# Patient Record
Sex: MALE | Race: WHITE | NOT HISPANIC OR LATINO | URBAN - METROPOLITAN AREA
[De-identification: names, ages, dates, MRNs, and addresses within clinical notes are randomized per-mention and may not be internally consistent; named-entity substitution may affect disease eponyms.]

---

## 2017-01-05 ENCOUNTER — OFFICE VISIT - RIVER FALLS (OUTPATIENT)
Dept: FAMILY MEDICINE | Facility: CLINIC | Age: 47
End: 2017-01-05

## 2017-01-12 ENCOUNTER — OFFICE VISIT - RIVER FALLS (OUTPATIENT)
Dept: FAMILY MEDICINE | Facility: CLINIC | Age: 47
End: 2017-01-12

## 2017-01-12 ASSESSMENT — MIFFLIN-ST. JEOR: SCORE: 2217.94

## 2017-02-16 ENCOUNTER — OFFICE VISIT - RIVER FALLS (OUTPATIENT)
Dept: FAMILY MEDICINE | Facility: CLINIC | Age: 47
End: 2017-02-16

## 2017-05-03 ENCOUNTER — OFFICE VISIT - RIVER FALLS (OUTPATIENT)
Dept: FAMILY MEDICINE | Facility: CLINIC | Age: 47
End: 2017-05-03

## 2017-05-03 ASSESSMENT — MIFFLIN-ST. JEOR: SCORE: 2087.3

## 2017-07-06 ENCOUNTER — OFFICE VISIT - RIVER FALLS (OUTPATIENT)
Dept: FAMILY MEDICINE | Facility: CLINIC | Age: 47
End: 2017-07-06

## 2017-07-06 ASSESSMENT — MIFFLIN-ST. JEOR: SCORE: 2035.59

## 2017-09-21 ENCOUNTER — AMBULATORY - RIVER FALLS (OUTPATIENT)
Dept: FAMILY MEDICINE | Facility: CLINIC | Age: 47
End: 2017-09-21

## 2017-10-31 ENCOUNTER — OFFICE VISIT - RIVER FALLS (OUTPATIENT)
Dept: FAMILY MEDICINE | Facility: CLINIC | Age: 47
End: 2017-10-31

## 2018-01-09 ENCOUNTER — COMMUNICATION - RIVER FALLS (OUTPATIENT)
Dept: FAMILY MEDICINE | Facility: CLINIC | Age: 48
End: 2018-01-09

## 2018-01-09 ENCOUNTER — AMBULATORY - RIVER FALLS (OUTPATIENT)
Dept: FAMILY MEDICINE | Facility: CLINIC | Age: 48
End: 2018-01-09

## 2018-01-10 LAB
CHOLEST SERPL-MCNC: 113 MG/DL
CHOLEST/HDLC SERPL: 3.6 {RATIO}
CREAT SERPL-MCNC: 1 MG/DL (ref 0.6–1.35)
GLUCOSE BLD-MCNC: 95 MG/DL (ref 65–99)
HBA1C MFR BLD: 5.2 %
HDLC SERPL-MCNC: 31 MG/DL
LDLC SERPL CALC-MCNC: 69 MG/DL
NONHDLC SERPL-MCNC: 82 MG/DL
TRIGL SERPL-MCNC: 58 MG/DL

## 2018-01-16 ENCOUNTER — OFFICE VISIT - RIVER FALLS (OUTPATIENT)
Dept: FAMILY MEDICINE | Facility: CLINIC | Age: 48
End: 2018-01-16

## 2018-01-16 ASSESSMENT — MIFFLIN-ST. JEOR: SCORE: 1970.28

## 2018-02-01 ENCOUNTER — AMBULATORY - RIVER FALLS (OUTPATIENT)
Dept: FAMILY MEDICINE | Facility: CLINIC | Age: 48
End: 2018-02-01

## 2018-06-07 ENCOUNTER — OFFICE VISIT - RIVER FALLS (OUTPATIENT)
Dept: FAMILY MEDICINE | Facility: CLINIC | Age: 48
End: 2018-06-07

## 2018-06-07 ASSESSMENT — MIFFLIN-ST. JEOR: SCORE: 1949.41

## 2018-07-10 ENCOUNTER — OFFICE VISIT - RIVER FALLS (OUTPATIENT)
Dept: FAMILY MEDICINE | Facility: CLINIC | Age: 48
End: 2018-07-10

## 2018-11-01 ENCOUNTER — OFFICE VISIT - RIVER FALLS (OUTPATIENT)
Dept: FAMILY MEDICINE | Facility: CLINIC | Age: 48
End: 2018-11-01

## 2018-11-01 ASSESSMENT — MIFFLIN-ST. JEOR: SCORE: 1973.9

## 2019-01-03 ENCOUNTER — AMBULATORY - RIVER FALLS (OUTPATIENT)
Dept: FAMILY MEDICINE | Facility: CLINIC | Age: 49
End: 2019-01-03

## 2019-01-04 LAB
BUN SERPL-MCNC: 16 MG/DL (ref 7–25)
BUN/CREAT RATIO - HISTORICAL: NORMAL (ref 6–22)
CALCIUM SERPL-MCNC: 9.3 MG/DL (ref 8.6–10.3)
CHLORIDE BLD-SCNC: 103 MMOL/L (ref 98–110)
CHOLEST SERPL-MCNC: 152 MG/DL
CHOLEST/HDLC SERPL: 4.2 {RATIO}
CO2 SERPL-SCNC: 32 MMOL/L (ref 20–32)
CREAT SERPL-MCNC: 0.96 MG/DL (ref 0.7–1.33)
EGFRCR SERPLBLD CKD-EPI 2021: 92 ML/MIN/1.73M2
GLUCOSE BLD-MCNC: 99 MG/DL (ref 65–99)
HBA1C MFR BLD: 5.2 %
HDLC SERPL-MCNC: 36 MG/DL
LDLC SERPL CALC-MCNC: 93 MG/DL
NONHDLC SERPL-MCNC: 116 MG/DL
POTASSIUM BLD-SCNC: 4.4 MMOL/L (ref 3.5–5.3)
SODIUM SERPL-SCNC: 139 MMOL/L (ref 135–146)
TRIGL SERPL-MCNC: 132 MG/DL

## 2019-01-17 ENCOUNTER — OFFICE VISIT - RIVER FALLS (OUTPATIENT)
Dept: FAMILY MEDICINE | Facility: CLINIC | Age: 49
End: 2019-01-17

## 2019-01-17 ASSESSMENT — MIFFLIN-ST. JEOR: SCORE: 2014.73

## 2019-03-21 ENCOUNTER — OFFICE VISIT - RIVER FALLS (OUTPATIENT)
Dept: FAMILY MEDICINE | Facility: CLINIC | Age: 49
End: 2019-03-21

## 2019-05-16 ENCOUNTER — OFFICE VISIT - RIVER FALLS (OUTPATIENT)
Dept: FAMILY MEDICINE | Facility: CLINIC | Age: 49
End: 2019-05-16

## 2019-06-06 ENCOUNTER — OFFICE VISIT - RIVER FALLS (OUTPATIENT)
Dept: FAMILY MEDICINE | Facility: CLINIC | Age: 49
End: 2019-06-06

## 2019-07-23 ENCOUNTER — COMMUNICATION - RIVER FALLS (OUTPATIENT)
Dept: FAMILY MEDICINE | Facility: CLINIC | Age: 49
End: 2019-07-23

## 2019-09-24 ENCOUNTER — OFFICE VISIT - RIVER FALLS (OUTPATIENT)
Dept: FAMILY MEDICINE | Facility: CLINIC | Age: 49
End: 2019-09-24

## 2019-09-24 ASSESSMENT — MIFFLIN-ST. JEOR: SCORE: 2051.92

## 2019-12-17 ENCOUNTER — OFFICE VISIT - RIVER FALLS (OUTPATIENT)
Dept: FAMILY MEDICINE | Facility: CLINIC | Age: 49
End: 2019-12-17

## 2019-12-17 ASSESSMENT — MIFFLIN-ST. JEOR: SCORE: 2072.79

## 2019-12-24 ENCOUNTER — OFFICE VISIT - RIVER FALLS (OUTPATIENT)
Dept: FAMILY MEDICINE | Facility: CLINIC | Age: 49
End: 2019-12-24

## 2019-12-24 ASSESSMENT — MIFFLIN-ST. JEOR: SCORE: 2096.38

## 2020-01-07 ENCOUNTER — AMBULATORY - RIVER FALLS (OUTPATIENT)
Dept: FAMILY MEDICINE | Facility: CLINIC | Age: 50
End: 2020-01-07

## 2020-01-08 ENCOUNTER — COMMUNICATION - RIVER FALLS (OUTPATIENT)
Dept: FAMILY MEDICINE | Facility: CLINIC | Age: 50
End: 2020-01-08

## 2020-01-08 LAB
BUN SERPL-MCNC: 18 MG/DL (ref 7–25)
BUN/CREAT RATIO - HISTORICAL: ABNORMAL (ref 6–22)
CALCIUM SERPL-MCNC: 8.7 MG/DL (ref 8.6–10.3)
CHLORIDE BLD-SCNC: 104 MMOL/L (ref 98–110)
CHOLEST SERPL-MCNC: 156 MG/DL
CHOLEST/HDLC SERPL: 4.1 {RATIO}
CO2 SERPL-SCNC: 28 MMOL/L (ref 20–32)
CREAT SERPL-MCNC: 1.04 MG/DL (ref 0.7–1.33)
EGFRCR SERPLBLD CKD-EPI 2021: 83 ML/MIN/1.73M2
GLUCOSE BLD-MCNC: 100 MG/DL (ref 65–99)
HBA1C MFR BLD: 5.4 %
HDLC SERPL-MCNC: 38 MG/DL
LDLC SERPL CALC-MCNC: 93 MG/DL
NONHDLC SERPL-MCNC: 118 MG/DL
POTASSIUM BLD-SCNC: 4.3 MMOL/L (ref 3.5–5.3)
SODIUM SERPL-SCNC: 139 MMOL/L (ref 135–146)
TRIGL SERPL-MCNC: 148 MG/DL

## 2020-01-21 ENCOUNTER — OFFICE VISIT - RIVER FALLS (OUTPATIENT)
Dept: FAMILY MEDICINE | Facility: CLINIC | Age: 50
End: 2020-01-21

## 2020-01-21 ASSESSMENT — MIFFLIN-ST. JEOR: SCORE: 2108.17

## 2020-06-16 ENCOUNTER — OFFICE VISIT - RIVER FALLS (OUTPATIENT)
Dept: FAMILY MEDICINE | Facility: CLINIC | Age: 50
End: 2020-06-16

## 2020-06-16 ENCOUNTER — COMMUNICATION - RIVER FALLS (OUTPATIENT)
Dept: FAMILY MEDICINE | Facility: CLINIC | Age: 50
End: 2020-06-16

## 2020-06-16 ASSESSMENT — MIFFLIN-ST. JEOR: SCORE: 2105.45

## 2021-01-12 ENCOUNTER — AMBULATORY - RIVER FALLS (OUTPATIENT)
Dept: FAMILY MEDICINE | Facility: CLINIC | Age: 51
End: 2021-01-12

## 2021-01-13 ENCOUNTER — COMMUNICATION - RIVER FALLS (OUTPATIENT)
Dept: FAMILY MEDICINE | Facility: CLINIC | Age: 51
End: 2021-01-13

## 2021-01-13 LAB
BUN SERPL-MCNC: 19 MG/DL (ref 7–25)
BUN/CREAT RATIO - HISTORICAL: ABNORMAL (ref 6–22)
CALCIUM SERPL-MCNC: 9.4 MG/DL (ref 8.6–10.3)
CHLORIDE BLD-SCNC: 105 MMOL/L (ref 98–110)
CHOLEST SERPL-MCNC: 147 MG/DL
CHOLEST/HDLC SERPL: 4.1 {RATIO}
CO2 SERPL-SCNC: 25 MMOL/L (ref 20–32)
CREAT SERPL-MCNC: 1 MG/DL (ref 0.7–1.33)
EGFRCR SERPLBLD CKD-EPI 2021: 86 ML/MIN/1.73M2
GLUCOSE BLD-MCNC: 103 MG/DL (ref 65–99)
HBA1C MFR BLD: 5.3 %
HDLC SERPL-MCNC: 36 MG/DL
LDLC SERPL CALC-MCNC: 88 MG/DL
NONHDLC SERPL-MCNC: 111 MG/DL
POTASSIUM BLD-SCNC: 4.4 MMOL/L (ref 3.5–5.3)
SODIUM SERPL-SCNC: 138 MMOL/L (ref 135–146)
TRIGL SERPL-MCNC: 133 MG/DL

## 2021-01-26 ENCOUNTER — OFFICE VISIT - RIVER FALLS (OUTPATIENT)
Dept: FAMILY MEDICINE | Facility: CLINIC | Age: 51
End: 2021-01-26

## 2021-01-26 ASSESSMENT — MIFFLIN-ST. JEOR: SCORE: 2162.6

## 2021-12-30 ENCOUNTER — COMMUNICATION - RIVER FALLS (OUTPATIENT)
Dept: FAMILY MEDICINE | Facility: CLINIC | Age: 51
End: 2021-12-30

## 2022-01-11 ENCOUNTER — COMMUNICATION - RIVER FALLS (OUTPATIENT)
Dept: FAMILY MEDICINE | Facility: CLINIC | Age: 52
End: 2022-01-11

## 2022-02-03 ENCOUNTER — COMMUNICATION - RIVER FALLS (OUTPATIENT)
Dept: FAMILY MEDICINE | Facility: CLINIC | Age: 52
End: 2022-02-03

## 2022-02-12 VITALS
TEMPERATURE: 97.5 F | BODY MASS INDEX: 34.99 KG/M2 | HEIGHT: 73 IN | HEART RATE: 72 BPM | TEMPERATURE: 97.3 F | BODY MASS INDEX: 35.33 KG/M2 | HEART RATE: 74 BPM | HEART RATE: 72 BPM | HEIGHT: 73 IN | SYSTOLIC BLOOD PRESSURE: 126 MMHG | WEIGHT: 258.8 LBS | BODY MASS INDEX: 34.3 KG/M2 | SYSTOLIC BLOOD PRESSURE: 114 MMHG | TEMPERATURE: 97.9 F | WEIGHT: 264 LBS | DIASTOLIC BLOOD PRESSURE: 68 MMHG | DIASTOLIC BLOOD PRESSURE: 74 MMHG | WEIGHT: 266.6 LBS | DIASTOLIC BLOOD PRESSURE: 67 MMHG | SYSTOLIC BLOOD PRESSURE: 105 MMHG | HEIGHT: 73 IN

## 2022-02-12 VITALS
HEIGHT: 73 IN | DIASTOLIC BLOOD PRESSURE: 71 MMHG | SYSTOLIC BLOOD PRESSURE: 109 MMHG | BODY MASS INDEX: 32.6 KG/M2 | WEIGHT: 246 LBS | TEMPERATURE: 97.4 F | HEART RATE: 67 BPM

## 2022-02-12 VITALS
HEART RATE: 72 BPM | SYSTOLIC BLOOD PRESSURE: 114 MMHG | WEIGHT: 249.8 LBS | DIASTOLIC BLOOD PRESSURE: 77 MMHG | TEMPERATURE: 97 F | HEART RATE: 52 BPM | DIASTOLIC BLOOD PRESSURE: 73 MMHG | BODY MASS INDEX: 32.96 KG/M2 | SYSTOLIC BLOOD PRESSURE: 113 MMHG | WEIGHT: 252.8 LBS | BODY MASS INDEX: 33.35 KG/M2 | TEMPERATURE: 97.6 F

## 2022-02-12 VITALS
OXYGEN SATURATION: 96 % | TEMPERATURE: 97.6 F | WEIGHT: 266 LBS | DIASTOLIC BLOOD PRESSURE: 86 MMHG | HEIGHT: 73 IN | SYSTOLIC BLOOD PRESSURE: 134 MMHG | HEART RATE: 70 BPM | BODY MASS INDEX: 35.25 KG/M2

## 2022-02-12 VITALS
SYSTOLIC BLOOD PRESSURE: 94 MMHG | HEART RATE: 78 BPM | DIASTOLIC BLOOD PRESSURE: 58 MMHG | TEMPERATURE: 97.3 F | BODY MASS INDEX: 31.3 KG/M2 | WEIGHT: 236.2 LBS | HEIGHT: 73 IN

## 2022-02-12 VITALS
TEMPERATURE: 98.5 F | SYSTOLIC BLOOD PRESSURE: 120 MMHG | HEART RATE: 82 BPM | SYSTOLIC BLOOD PRESSURE: 126 MMHG | HEIGHT: 73 IN | BODY MASS INDEX: 36.7 KG/M2 | HEART RATE: 77 BPM | WEIGHT: 290.8 LBS | TEMPERATURE: 97.6 F | BODY MASS INDEX: 38.54 KG/M2 | DIASTOLIC BLOOD PRESSURE: 79 MMHG | DIASTOLIC BLOOD PRESSURE: 77 MMHG | WEIGHT: 278.2 LBS

## 2022-02-12 VITALS
WEIGHT: 253.2 LBS | HEART RATE: 64 BPM | SYSTOLIC BLOOD PRESSURE: 114 MMHG | BODY MASS INDEX: 33.41 KG/M2 | DIASTOLIC BLOOD PRESSURE: 73 MMHG | TEMPERATURE: 97.4 F

## 2022-02-12 VITALS
DIASTOLIC BLOOD PRESSURE: 60 MMHG | HEIGHT: 73 IN | TEMPERATURE: 97.3 F | BODY MASS INDEX: 34.72 KG/M2 | HEART RATE: 72 BPM | WEIGHT: 262 LBS | SYSTOLIC BLOOD PRESSURE: 122 MMHG

## 2022-02-12 VITALS
BODY MASS INDEX: 33.69 KG/M2 | DIASTOLIC BLOOD PRESSURE: 73 MMHG | HEART RATE: 71 BPM | HEIGHT: 73 IN | WEIGHT: 254.2 LBS | TEMPERATURE: 97.2 F | SYSTOLIC BLOOD PRESSURE: 109 MMHG

## 2022-02-12 VITALS
WEIGHT: 237 LBS | DIASTOLIC BLOOD PRESSURE: 68 MMHG | BODY MASS INDEX: 31.41 KG/M2 | RESPIRATION RATE: 16 BRPM | HEIGHT: 73 IN | HEART RATE: 64 BPM | SYSTOLIC BLOOD PRESSURE: 104 MMHG | TEMPERATURE: 97 F

## 2022-02-12 VITALS
WEIGHT: 231.6 LBS | DIASTOLIC BLOOD PRESSURE: 73 MMHG | SYSTOLIC BLOOD PRESSURE: 124 MMHG | DIASTOLIC BLOOD PRESSURE: 62 MMHG | TEMPERATURE: 98 F | BODY MASS INDEX: 30.82 KG/M2 | HEIGHT: 73 IN | SYSTOLIC BLOOD PRESSURE: 116 MMHG | TEMPERATURE: 97.4 F | HEART RATE: 64 BPM | HEART RATE: 63 BPM | BODY MASS INDEX: 30.69 KG/M2 | WEIGHT: 233.6 LBS

## 2022-02-12 VITALS
BODY MASS INDEX: 33.21 KG/M2 | WEIGHT: 250.6 LBS | TEMPERATURE: 97.8 F | DIASTOLIC BLOOD PRESSURE: 74 MMHG | SYSTOLIC BLOOD PRESSURE: 112 MMHG | HEART RATE: 73 BPM | HEIGHT: 73 IN

## 2022-02-12 VITALS
WEIGHT: 278.6 LBS | BODY MASS INDEX: 36.92 KG/M2 | HEIGHT: 73 IN | HEART RATE: 72 BPM | SYSTOLIC BLOOD PRESSURE: 120 MMHG | TEMPERATURE: 97.5 F | DIASTOLIC BLOOD PRESSURE: 82 MMHG

## 2022-02-12 VITALS
TEMPERATURE: 97.7 F | HEART RATE: 74 BPM | BODY MASS INDEX: 31.48 KG/M2 | SYSTOLIC BLOOD PRESSURE: 109 MMHG | WEIGHT: 238.6 LBS | DIASTOLIC BLOOD PRESSURE: 69 MMHG

## 2022-02-16 NOTE — NURSING NOTE
CAGE Assessment Entered On:  1/18/2019 9:37 AM CST    Performed On:  1/17/2019 9:37 AM CST by Tori Szymanski               Assessment   Have you ever felt you should cut down on your drinking :   No   Have people annoyed you by criticizing your drinking :   No   Have you ever felt bad or guilty about your drinking :   No   Have you ever taken a drink first thing in the morning to steady your nerves or get rid of a hangover (Eye-opener) :   No   CAGE Score :   0    Tori Szymanski - 1/18/2019 9:37 AM CST

## 2022-02-16 NOTE — TELEPHONE ENCOUNTER
---------------------  From: Silvio Burns MD   To: VIRGIL MATHEWS    Sent: 1/8/2020 7:45:05 AM CST  Subject: Patient Message - Results Notification        All labs acceptable.  Please let us know you received this message by either selecting Forward or Reply at the top.  Thank you    Results:  Date Result Name Ind Value Ref Range   1/7/2020 8:36 AM Potassium Level  4.3 mmol/L (3.5 - 5.3)   1/7/2020 8:36 AM Glucose Level ((H)) 100 mg/dL (65 - 99)   1/7/2020 8:36 AM Creatinine Level  1.04 mg/dL (0.70 - 1.33)   1/7/2020 8:36 AM Hgb A1c  5.4 ( - <5.7)   1/7/2020 8:36 AM Cholesterol  156 mg/dL ( - <200)   1/7/2020 8:36 AM HDL ((L)) 38 mg/dL (>40 - )   1/7/2020 8:36 AM LDL  93    1/7/2020 8:36 AM Triglyceride  148 mg/dL ( - <150)

## 2022-02-16 NOTE — PROGRESS NOTES
Patient:   VIRGIL MATHEWS            MRN: [id number]            FIN: [id number]               Age:   48 years     Sex:  Male     :  1970   Associated Diagnoses:   Changing skin lesion   Author:   Silvio Burns MD      Visit Information      Date of Service: 2019 09:44 am  Performing Location: [facility]  Encounter#: [id number]      Primary Care Provider (PCP):  Silvio Burns MD    NPI# [id number]      Referring Provider:  Silvio Burns MD# [id number]      Chief Complaint   2019 9:50 AM CDT    Skin concern        History of Present Illness   chief complaint and symptoms as noted above confirmed with patient   lesion right shoulder for a few months         Review of Systems   Constitutional:  Negative except as documented in history of present illness.    Integumentary:  Negative except as documented in history of present illness.       Health Status   Allergies:    Allergic Reactions (Selected)  Severity Not Documented  Cats (No reactions were documented)  Citalopram (Hives)   Medications:  (Selected)   Prescriptions  Prescribed  Lipitor 20 mg oral tablet: = 1 tab(s) ( 20 mg ), PO, Daily, # 90 tab(s), 3 Refill(s), Type: Maintenance, Pharmacy: [facility], 1 tab(s) Oral daily  Xarelto 20 mg oral tablet: = 1 tab(s) ( 20 mg ), po, qpm, # 90 tab(s), 3 Refill(s), Type: Maintenance, Pharmacy: [facility], 1 tab(s) Oral qpm  cyclobenzaprine 10 mg oral tablet: See Instructions, Instructions: TAKE ONE TABLET BY MOUTH THREE TIMES A DAY AS NEEDED FOR PAIN, # 30 tab(s), 1 Refill(s), Type: Soft Stop, Pharmacy: [facility]  ketorolac 0.5% ophthalmic solution: 1 drop(s), Eye-Both, qid, PRN: as needed for itching, # 5 mL, 11 Refill(s), Type: Soft Stop, Pharmacy: [facility], 1 drop(s) Eye-Both qid,PRN:as needed for itching  olopatadine 0.1% ophthalmic solution: 1 drop(s), both eyes, bid, PRN: for allergy symptoms, # 5 mL, 11 Refill(s), Type: Maintenance,  Pharmacy: [facility], 1 drop(s) Eye-Both bid,PRN:for allergy symptoms  raNITIdine 300 mg oral tablet: = 1 tab(s) ( 300 mg ), po, daily, # 90 tab(s), 3 Refill(s), Type: Maintenance, Pharmacy: [facility], 1 tab(s) Oral daily  traMADol 50 mg oral tablet: 1 tab(s) ( 50 mg ), PO, q4hr, PRN: for pain, # 60 tab(s), 1 Refill(s), Type: Maintenance, called to pharmacy (Rx)  venlafaxine 150 mg oral capsule, extended release: = 1 cap(s) ( 150 mg ), po, daily, # 90 cap(s), 3 Refill(s), Type: Maintenance, Pharmacy: [facility], 1 cap(s) Oral daily  Documented Medications  Documented  Claritin 10 mg oral tablet: 1 tab(s) ( 10 mg ), po, daily, 0 Refill(s), Type: Maintenance  Flonase 50 mcg/inh nasal spray: 2 spray(s), nasal, daily, 0 Refill(s), Type: Maintenance  Multiple Vitamins oral tablet: 1 tab(s), PO, Daily, 0 Refill(s)  Tylenol: PO, 0 Refill(s)  clotrimazole 1% topical cream: Topical, bid, Instructions: to replace Econazole 1% cream which is not covered by insurance., 0 Refill(s), Type: Maintenance   Problem list:    All Problems (Selected)  Carpal tunnel syndrome, left / SNOMED CT 19227770 / Confirmed  Back pain, chronic / SNOMED CT 157011938 / Confirmed  Dysthymia / SNOMED CT 406496384 / Confirmed  GERD (gastroesophageal reflux disease) / SNOMED CT 103232600 / Confirmed  Hx pulmonary embolism / SNOMED CT 640230061 / Confirmed  Low HDL (under 40) / SNOMED CT 883481159 / Confirmed  Monoclonal gammopathy / SNOMED CT 096619359 / Confirmed  Obese / SNOMED CT 3944290155 / Probable  Seasonal allergies / SNOMED CT 664792258 / Confirmed  Snoring disorder / ICD-9-.09 / Confirmed      Histories   Past Medical History:    Active  Dysthymia (302297523): Onset on 7/22/2010 at 39 years.  Comments:  7/22/2010 CDT 1:18 PM CDT -     GERD (gastroesophageal reflux disease) (293499860)  Low HDL (under 40) (271524878)  Obese (4356244536)  Seasonal allergies (024407685)  Comments:  12/19/2012 CST 9:33 AM ANIYAH - [facility]  [facility]  Stable.  Back pain, chronic (176909185)  Comments:  12/19/2012 CST 9:54 AM [facility] Qureshi  An MRI has been done previously, maybe in 2005.  Snoring disorder (786.09)  Comments:  12/19/2012 CST 9:40 AM [facility] Qureshi  ENT evaluation by Dr. Jaleel Douglass on 05/07/2008.  A sleep study was performed; there was no evidence of apnea.    12/19/2012 CST 9:44 AM [facility] Qureshi  This began after a severe pharyngitis.  Carpal tunnel syndrome, left (14311300)  Comments:  12/19/2012 CST 9:49 AM [facility] Qureshi  Marcaine and Celestone injection done on 08/17/2007.  Resolved  *Hospitalized@[facility] - Bilateral pulmonary emboli: Onset on 5/15/2014 at 43 years.  Resolved on 5/16/2014 at 43 years.  Folliculitis (32292013): Onset in the month of 4/2008 at 37 years  Resolved.  Comments:  12/19/2012 CST 9:46 AM [facility] Qureshi  Right thigh.  Irregular heartbeat (566570340): Onset on 4/17/2006 at 35 years.  Resolved.  Comments:  12/19/2012 CST 9:55 AM [facility] Qureshi  Holter monitor placed.  Multiple pulmonary emboli (07166974):  Resolved.  Bilateral pulmonary embolism (32175924):  Resolved.   Family History:    Carpal Tunnel Syndrome  Mother  Asthma  Grandfather (P)  Daughter (Ceola)  Hypertension  Mother  Heart disease  Father  CA - Cancer  Mother  Comments:  1/12/2016 3:42 PM Angeline Dawson  skin  Hypercholesterolemia  Mother  Parkinson disease  Mother  HTN - Hypertension  Father  Back  Mother  Father  Prostate cancer..  Uncle  Comments:  12/19/2012 9:23 AM [facility] Qureshi  Prostate cancer in his 60s.  GERD - Gastro-esophageal reflux disease  Sister  ASHD - Atherosclerotic heart disease  Mother     Procedure history:    Screening for colon cancer (SNOMED CT 0438120016) performed by Silvio Burns MD on 3/19/2019 at 48 Years.  Comments:  3/26/2019 9:05 AM CDT - Shanika Shipman MA result:  negative  Recommendation:  f/u 3yrs  Cardiac computed tomography for calcium scoring (SNOMED CT 9491703590) on  1/24/2017 at 46 Years.  Comments:  2/2/2017 12:14 PM ANIYAH Bermudez Jackelyn  Adriane  Total Agatston calcium score is 18  Vasectomy (SNOMED CT 42366715) on 2/20/2009 at 38 Years.  Polysomnogram (SNOMED CT 090072905) on 4/21/2008 at 37 Years.  Comments:  12/19/2012 10:00 AM [facility] Qureshi  Normal sleep architecture.  No evidence of obstructive sleep apnea.  Wrist injection (SNOMED CT 104357782) on 8/17/2007 at 36 Years.  Comments:  12/19/2012 9:50 AM [facility] Qureshi  Marcaine and Celestone injection, left wrist, for carpal tunnel symptoms.  Holter monitor (SNOMED CT 740448952) performed by Britton Pepe MD on 4/20/2006 at 35 Years.  Comments:  12/19/2012 10:03 AM [facility] Qureshi  Single 7-beat episode of asymptomatic slow atrial tachycardia with a rate of 102.  No other significant dysrhythmia.  Single episode of patient symptoms without dysrhythmia.  Cellulitis (SNOMED CT 3020498402) in 2005 at 35 Years.  Stress test ECG - treadmill (SNOMED CT 466911279) on 6/7/2005 at 34 Years.  Comments:  12/19/2012 10:06 AM [facility] Qureshi  Performed at [facility] for non-exertional chest discomfort.  Adenoidectomy (SNOMED CT 850371615).  Tonsillectomy (SNOMED CT 409933965).  Lymph node removal from axilla.   Social History:        Alcohol Assessment            Current, 2-4 TIMES PER WEEK, 1 drinks/episode average.      Tobacco Assessment            Never      Substance Abuse Assessment            Never      Employment and Education Assessment            Employed, Work/School description: .      Home and Environment Assessment            Marital status: .  Spouse/Partner name: Mera Caballero.  3 children.  Risks in environment: owns               secured gun.      Nutrition and Health Assessment            Type of diet: Regular.      Exercise and Physical Activity Assessment            Exercise frequency: 1-2 times/week.  Exercise type: Walking, elliptical.      Sexual Assessment             Sexually active: Yes.  Sexual orientation: Straight or heterosexual.  Contraceptive Use Details: vasectomy,               wife had hysterectomy.        Physical Examination   Vital Signs   9/24/2019 9:50 AM CDT Temperature Tympanic 97.2 DegF  LOW    Peripheral Pulse Rate 71 bpm    HR Method Electronic    Systolic Blood Pressure 109 mmHg    Diastolic Blood Pressure 73 mmHg    Mean Arterial Pressure 85 mmHg    BP Method Electronic      Measurements from flowsheet : Measurements   9/24/2019 9:50 AM CDT Height Measured - Standard 73 in    Weight Measured - Standard 254.2 lb    BSA 2.43 m2    Body Mass Index 33.53 kg/m2  HI      General:  Alert and oriented, No acute distress.    Integumentary:  0.5 cm elevated blanka like lesion right post shoulder  frozen x3 with liquid nitrogen.    Neurologic:  Alert, Oriented.       Impression and Plan   Diagnosis     Changing skin lesion ([id number]-KI L98.9).     Course:  Progressing as expected.    Plan:  Wound therapy, likely BCC  fu 1 month if not gone.    Patient Instructions:       Counseled: Patient, Regarding diagnosis, Activity.

## 2022-02-16 NOTE — TELEPHONE ENCOUNTER
Entered by Charlotte Villabla on October 27, 2020 9:54:11 AM CDT  ---------------------  From: Charlotte Villalba   To: [facility]    Sent: 10/27/2020 9:54:11 AM CDT  Subject: Medication Management     ** Submitted: **  Order:famotidine (famotidine 40 mg oral tablet)  1 tab(s)  Oral  daily  Qty:  90 tab(s)        Refills:  0          Substitutions Allowed     Route To Pharmacy - [facility]    Signed by Charlotte Villalba  10/27/2020 2:53:00 PM UT    ** Submitted: **  Complete:famotidine (Pepcid 40 mg oral tablet)   Signed by Charlotte Villalba  10/27/2020 2:54:00 PM UT    ** Not Approved:  **  famotidine (FAMOTIDINE 40MG TABS)  TAKE ONE TABLET BY MOUTH EVERY DAY  Qty:  90 unknown unit        Days Supply:  90        Refills:  3          Substitutions Allowed     Route To [facility] - [facility]   Signed by Charlotte Villalba          Med Refill      Date of last office visit and reason:  6/16/20 for shoulder/injection with ARIANA      Date of last Med Check / Px:   Px with TFS 1/21/20  Date of last labs pertaining to med:  n/a    RTC order in chart:  Yes, UTD through 1/21    For Protocol refill, has patient been contacted:  Filled for 1 year at last px.  Will fill for 3 months.     Medication filled or not filled per clinic policy.       ------------------------------------------  From: [facility]  To: Silvio Burns MD  Sent: October 27, 2020 9:00:11 AM CDT  Subject: Medication Management  Due: October 8, 2020 2:04:31 PM CDT     ** On Hold Pending Signature **     Drug: famotidine (famotidine 40 mg oral tablet), TAKE ONE TABLET BY MOUTH EVERY DAY  Quantity: 90 unknown unit  Days Supply: 90  Refills: 2  Substitutions Allowed  Notes from Pharmacy:     Dispensed Drug: famotidine (famotidine 40 mg oral tablet), TAKE ONE TABLET BY MOUTH EVERY DAY  Quantity: 90 unknown unit  Days Supply: 90  Refills: 3  Substitutions Allowed  Notes from Pharmacy:  ------------------------------------------

## 2022-02-16 NOTE — PROGRESS NOTES
Patient:   VIRGIL MATHEWS            MRN: [id number]            FIN: [id number]               Age:   46 years     Sex:  Male     :  1970   Associated Diagnoses:   Back pain, chronic; Dysthymia; HTN (Hypertension); Multiple pulmonary emboli; Seasonal Allergies   Author:   Silvio Burns MD      Visit Information      Date of Service: 2017 08:27 am  Performing Location: [facility]  Encounter#: [id number]      Primary Care Provider (PCP):  Silvio Burns MD# [id number]      Referring Provider:  Silvio Burns MD# [id number]      Chief Complaint   2017 8:34 AM CDT     HTN checkup.        History of Present Illness   Doing well Working hard on wt loss                The patient presents for follow-up evaluation of hypertension.  The context of the hypertension: blood pressure was maintained within the target range.  Relieving factors consist of medication.  Prior treatment consists of lifestyle modification weight reduction.        Review of Systems   Constitutional:  Negative except as documented in history of present illness.    Ear/Nose/Mouth/Throat:  Negative.    Respiratory:  Negative.    Cardiovascular:  Negative.    Gastrointestinal:  Negative.    Genitourinary:  Negative.    Musculoskeletal:  Negative except as documented in history of present illness.    Integumentary:  Negative except as documented in history of present illness.    Neurologic:  Negative.       Health Status   Allergies:    Allergic Reactions (Selected)  Severity Not Documented  Cats (No reactions were documented)  Citalopram (Hives)   Medications:  (Selected)   Prescriptions  Prescribed  Flexeril 10 mg oral tablet: 1 tab(s) ( 10 mg ), PO, TID, PRN: for pain, # 30 tab(s), 1 Refill(s), Type: Maintenance, called to pharmacy (Rx), 1 tab(s) po tid,PRN:for pain  Lipitor 20 mg oral tablet: 1 tab(s) ( 20 mg ), PO, Daily, # 90 tab(s), 1 Refill(s), Type: Maintenance, Pharmacy: [facility],  1 tab(s) po daily  Pataday 0.2% ophthalmic solution: 1 drop(s), both eyes, daily, # 2.5 mL, 5 Refill(s), Type: Soft Stop, Pharmacy: [facility], 1 drop(s) both eyes daily  Xarelto 20 mg oral tablet: 1 tab(s) ( 20 mg ), po, qpm, # 90 tab(s), 1 Refill(s), Type: Maintenance, Pharmacy: [facility], 1 tab(s) po qpm  econazole 1% topical cream: 1 modesto, TOP, BID, # 85 g, 5 Refill(s), Type: Maintenance, Pharmacy: [facility], 1 modesto top bid  ketorolac 0.5% ophthalmic solution: 1 drop(s), Both eyes, QID, PRN: for itching, # 5 mL, 6 Refill(s), Type: Maintenance, Pharmacy: [facility], 1 drop(s) both eyes qid,PRN:for itching  lisinopril 10 mg oral tablet: 1 tab(s) ( 10 mg ), po, daily, # 90 tab(s), 1 Refill(s), Type: Maintenance, Pharmacy: [facility], 1 tab(s) po daily  raNITIdine 300 mg oral tablet: 1 tab(s) ( 300 mg ), po, daily, # 90 tab(s), 1 Refill(s), Type: Maintenance, Pharmacy: [facility], 1 tab(s) po daily  traMADol 50 mg oral tablet: 1 tab(s) ( 50 mg ), PO, q4hr, PRN: for pain, # 60 tab(s), 1 Refill(s), Type: Maintenance, called to pharmacy (Rx)  venlafaxine 150 mg oral capsule, extended release: 1 cap(s) ( 150 mg ), po, daily, # 90 cap(s), 1 Refill(s), Type: Maintenance, Pharmacy: [facility], 1 cap(s) po daily  Documented Medications  Documented  Claritin 10 mg oral tablet: 1 tab(s) ( 10 mg ), po, daily, 0 Refill(s), Type: Maintenance  Flonase 50 mcg/inh nasal spray: 2 spray(s), nasal, daily, 0 Refill(s), Type: Maintenance  Multiple Vitamins oral tablet: 1 tab(s), PO, Daily, 0 Refill(s)  Tylenol: PO, 0 Refill(s)   Problem list:    All Problems  GERD (Gastroesophageal Reflux Disease) / ICD-9-.81 / Confirmed  Low HDL (under 40) / SNOMED CT 998634675 / Confirmed  HTN (Hypertension) / ICD-9-.9 / Confirmed  Dysthymia / ICD-9-.4 / Confirmed  Obese / ICD-9-.00 / Probable  Seasonal Allergies / ICD-9-.9 / Confirmed  Back pain, chronic / ICD-9-.5 /  Confirmed  Snoring disorder / ICD-9-.09 / Confirmed  Carpal tunnel syndrome, left / ICD-9-.0 / Confirmed  Multiple pulmonary emboli / SNOMED CT 10906469 / Confirmed  Bilateral pulmonary embolism / SNOMED CT 74215342 / Confirmed  Inactive: Tinea pedis / SNOMED CT 34758622  Resolved: Folliculitis / SNOMED CT 82707024  Resolved: Irregular Heartbeat / ICD-9-.9  Resolved: *Hospitalized@[facility] - Bilateral pulmonary emboli      Histories   Past Medical History:    Active  GERD (Gastroesophageal Reflux Disease) (530.81)  Low HDL (under 40) (521711453)  HTN (Hypertension) (401.9)  Seasonal Allergies (477.9)  Comments:  12/19/2012 CST 9:33 AM Peace Qureshi  Stable.  Back pain, chronic (724.5)  Comments:  12/19/2012 CST 9:54 AM Peace Qureshi  An MRI has been done previously, maybe in 2005.  Snoring disorder (786.09)  Comments:  12/19/2012 CST 9:40 AM Peace Qureshi  ENT evaluation by Dr. Jaleel Douglass on 05/07/2008.  A sleep study was performed; there was no evidence of apnea.    12/19/2012 CST 9:44 AM Peace Qureshi  This began after a severe pharyngitis.  Carpal tunnel syndrome, left (354.0)  Comments:  12/19/2012 CST 9:49 AM Peace Qureshi  Marcaine and Celestone injection done on 08/17/2007.  Resolved  *Hospitalized@[facility] - Bilateral pulmonary emboli: Onset on 5/15/2014 at 43 years.  Resolved on 5/16/2014 at 43 years.  Folliculitis (67016650): Onset in the month of 4/2008 at 37 years  Resolved.  Comments:  12/19/2012 CST 9:46 AM Peace Qureshi  Right thigh.  Irregular Heartbeat (427.9): Onset on 4/17/2006 at 35 years.  Resolved.  Comments:  12/19/2012 CST 9:55 AM Peace Qureshi  Holter monitor placed.   Family History:    Carpal Tunnel Syndrome  Mother  Asthma  Grandfather (P)  Daughter (Ceola)  Hypertension  Mother  Heart disease  Father  CA - Cancer  Mother  Comments:  1/12/2016 3:42 PM - Angeline Kong  Hypercholesterolemia  Mother  Parkinson disease  Mother  HTN -  Hypertension  Father  Back  Mother  Father  Prostate cancer..  Uncle  Comments:  12/19/2012 9:23 AM - Peace Ibarra  Prostate cancer in his 60s.  ASHD - Atherosclerotic heart disease  Mother     Procedure history:    Cardiac computed tomography for calcium scoring (SNOMED CT 3902015209) on 1/24/2017 at 46 Years.  Comments:  2/2/2017 12:14 PM - Adriane Hayden  Total Agatston calcium score is 18  Vasectomy (SNOMED CT 66367223) on 2/20/2009 at 38 Years.  Polysomnogram (SNOMED CT 116531517) on 4/21/2008 at 37 Years.  Comments:  12/19/2012 10:00 AM - Peace Ibarra  Normal sleep architecture.  No evidence of obstructive sleep apnea.  Wrist injection (SNOMED CT 430263537) on 8/17/2007 at 36 Years.  Comments:  12/19/2012 9:50 AM - Peace Ibarra  Marcaine and Celestone injection, left wrist, for carpal tunnel symptoms.  Holter monitor (SNOMED CT 977307935) performed by Britton Pepe MD on 4/20/2006 at 35 Years.  Comments:  12/19/2012 10:03 AM - Peace Ibarra  Single 7-beat episode of asymptomatic slow atrial tachycardia with a rate of 102.  No other significant dysrhythmia.  Single episode of patient symptoms without dysrhythmia.  Cellulitis (SNOMED CT 9936863720) in 2005 at 35 Years.  Stress test ECG - treadmill (SNOMED CT 414987663) on 6/7/2005 at 34 Years.  Comments:  12/19/2012 10:06 AM - Peace Ibarra  Performed at [facility] for non-exertional chest discomfort.  Adenoidectomy (SNOMED CT 074057739).  Tonsillectomy (SNOMED CT 941768547).  Lymph node removal from axilla.   Social History:        Alcohol Assessment: Current            Current                     Comments:                      03/08/2017 - Adelia Manzo                     1-2 drinks 2-3 times per week      Tobacco Assessment: Denies Tobacco Use            Never      Substance Abuse Assessment: Denies Substance Abuse            Never      Employment and Education Assessment            Employed, Work/School description: .      Home and  Environment Assessment            Marital status: .  Spouse/Partner name: Mera Caballero.  3 children.      Nutrition and Health Assessment            Type of diet: Regular.        Physical Examination   Vital Signs   7/6/2017 8:34 AM CDT Temperature Tympanic 97.8 DegF  LOW    Peripheral Pulse Rate 73 bpm    Systolic Blood Pressure 112 mmHg    Diastolic Blood Pressure 74 mmHg    Mean Arterial Pressure 87 mmHg      Measurements from flowsheet : Measurements   7/6/2017 8:34 AM CDT Height Measured - Standard 73 in    Weight Measured - Standard 250.6 lb    BSA 2.42 m2    Body Mass Index 33.06 kg/m2      General:  Alert and oriented, No acute distress.    Eye:  Pupils are equal, round and reactive to light, Extraocular movements are intact.    HENT:  Normocephalic, Tympanic membranes are clear, Normal hearing, Oral mucosa is moist.    Neck:  Supple, Non-tender, No carotid bruit, No jugular venous distention, No lymphadenopathy, No thyromegaly.    Respiratory:  Lungs are clear to auscultation, Respirations are non-labored, Breath sounds are equal.    Cardiovascular:  Normal rate, Regular rhythm, No murmur, Good pulses equal in all extremities, No edema.    Gastrointestinal:  Soft, Non-tender, Non-distended, Normal bowel sounds, No organomegaly.    Musculoskeletal:  Normal range of motion, Normal strength, No tenderness, No swelling, No deformity.    Integumentary:  Warm, Dry, No qualifying data available.   .    Neurologic:  Alert, Oriented, Normal sensory, Normal motor function, No focal deficits, Cranial Nerves II-XII are grossly intact, Normal deep tendon reflexes.    Psychiatric:  Cooperative, Appropriate mood & affect, Normal judgment.       Health Maintenance      Recommendations     Pending (in the next year)        Due            Aspirin Therapy for Prevention of CVD (Male) due  07/06/17  and every 5  year(s)        Due In Future            Lipid Disorders Screen (Male) not due until  01/05/18  and every  1  year(s)           Depression Screen (Male) not due until  01/12/18  and every 1  year(s)     Satisfied (in the past 1 year)        Satisfied            Alcohol Misuse Screen (Male) on  01/12/17.           Body Mass Index Check (Male) on  07/06/17.           Body Mass Index Check (Male) on  05/03/17.           Body Mass Index Check (Male) on  01/12/17.           Body Mass Index Check (Male) on  11/09/16.           Depression Screen (Male) on  01/12/17.           Depression Screen (Male) on  01/12/17.           Depression Screen (Male) on  01/12/17.           High Blood Pressure Screen (Male) on  07/06/17.           High Blood Pressure Screen (Male) on  05/03/17.           High Blood Pressure Screen (Male) on  02/16/17.           High Blood Pressure Screen (Male) on  01/12/17.           High Blood Pressure Screen (Male) on  11/09/16.           High Blood Pressure Screen (Male) on  09/06/16.           Lipid Disorders Screen (Male) on  01/05/17.           Lipid Disorders Screen (Male) on  01/05/17.           Lipid Disorders Screen (Male) on  01/05/17.           Lipid Disorders Screen (Male) on  01/05/17.           Obesity Screen and Counseling (Male) on  07/06/17.           Obesity Screen and Counseling (Male) on  05/03/17.           Obesity Screen and Counseling (Male) on  02/16/17.           Obesity Screen and Counseling (Male) on  01/12/17.           Obesity Screen and Counseling (Male) on  11/09/16.           Obesity Screen and Counseling (Male) on  09/06/16.           Tobacco Use Screen (Male) on  07/06/17.           Tobacco Use Screen (Male) on  05/03/17.           Tobacco Use Screen (Male) on  01/12/17.           Tobacco Use Screen (Male) on  11/09/16.           Tobacco Use Screen (Male) on  09/06/16.        Impression and Plan   Diagnosis     Back pain, chronic (NJF50-JK M54.9).     Dysthymia (DMT63-RC F34.1).     HTN (Hypertension) (KSR00-UM I10).     Multiple pulmonary emboli (OCF67-MX I26.99).     Seasonal  Allergies (AVS21-GU J30.2).     Course:  Progressing as expected.    Plan:  doing well 6 mo fu  .    Patient Instructions:       Counseled: Patient, Regarding diagnosis, Regarding treatment, Regarding medications, Diet, Activity.

## 2022-02-16 NOTE — PROGRESS NOTES
Patient:   VIRGIL MATHEWS            MRN: [id number]            FIN: [id number]               Age:   46 years     Sex:  Male     :  1970   Associated Diagnoses:   Acute allergic conjunctivitis   Author:   Silvio Burns MD      Visit Information      Date of Service: 2017 01:35 pm  Performing Location: [facility]  Encounter#: [id number]      Primary Care Provider (PCP):  Silvio Burns MD    NPI# [id number]      Referring Provider:  No referring provider recorded for selected visit.      Chief Complaint   5/3/2017 1:43 PM CDT     c/o bilateral redness and itching.        History of Present Illness   chief complaint and symptoms as noted above confirmed with patient   vision fine   sxs 1 week      Review of Systems   Constitutional:  Negative except as documented in history of present illness.    Eye:  Negative except as documented in history of present illness.    Ear/Nose/Mouth/Throat:  Negative.    Neurologic:  Negative.             Health Status   Allergies:    Allergic Reactions (Selected)  Severity Not Documented  Cats (No reactions were documented)  Citalopram (Hives)   Medications:  (Selected)   Prescriptions  Prescribed  Flexeril 10 mg oral tablet: 1 tab(s) ( 10 mg ), PO, TID, PRN: for pain, # 30 tab(s), 1 Refill(s), Type: Maintenance, called to pharmacy (Rx), 1 tab(s) po tid,PRN:for pain  Lipitor 20 mg oral tablet: 1 tab(s) ( 20 mg ), PO, Daily, # 90 tab(s), 1 Refill(s), Type: Maintenance, Pharmacy: [facility], 1 tab(s) po daily  Pataday 0.2% ophthalmic solution: 1 drop(s), both eyes, daily, # 2.5 mL, 5 Refill(s), Type: Soft Stop, Pharmacy: [facility], 1 drop(s) both eyes daily  Xarelto 20 mg oral tablet: 1 tab(s) ( 20 mg ), po, qpm, # 90 tab(s), 1 Refill(s), Type: Maintenance, Pharmacy: [facility], 1 tab(s) po qpm  econazole 1% topical cream: 1 modesto, TOP, BID, # 85 g, 5 Refill(s), Type: Maintenance, Pharmacy: [facility], 1 modesto top bid  ketorolac  0.5% ophthalmic solution: 1 drop(s), Both eyes, QID, PRN: for itching, # 5 mL, 6 Refill(s), Type: Maintenance, Pharmacy: [facility], 1 drop(s) both eyes qid,PRN:for itching  lisinopril 10 mg oral tablet: 1 tab(s) ( 10 mg ), po, daily, # 90 tab(s), 1 Refill(s), Type: Maintenance, Pharmacy: [facility], 1 tab(s) po daily  raNITIdine 300 mg oral tablet: 1 tab(s) ( 300 mg ), po, daily, # 90 tab(s), 1 Refill(s), Type: Maintenance, Pharmacy: [facility], 1 tab(s) po daily  traMADol 50 mg oral tablet: 1 tab(s) ( 50 mg ), PO, q4hr, PRN: for pain, # 60 tab(s), 1 Refill(s), Type: Maintenance, called to pharmacy (Rx)  venlafaxine 150 mg oral capsule, extended release: 1 cap(s) ( 150 mg ), po, daily, # 90 cap(s), 1 Refill(s), Type: Maintenance, Pharmacy: [facility], 1 cap(s) po daily  Documented Medications  Documented  Claritin 10 mg oral tablet: 1 tab(s) ( 10 mg ), po, daily, 0 Refill(s), Type: Maintenance  Flonase 50 mcg/inh nasal spray: 2 spray(s), nasal, daily, 0 Refill(s), Type: Maintenance  Multiple Vitamins oral tablet: 1 tab(s), PO, Daily, 0 Refill(s)  Tylenol: PO, 0 Refill(s)   Problem list:    All Problems (Selected)  GERD (Gastroesophageal Reflux Disease) / ICD-9-.81 / Confirmed  Low HDL (under 40) / SNOMED CT 944465311 / Confirmed  HTN (Hypertension) / ICD-9-.9 / Confirmed  Dysthymia / ICD-9-.4 / Confirmed  Obese / ICD-9-.00 / Probable  Seasonal Allergies / ICD-9-.9 / Confirmed  Back pain, chronic / ICD-9-.5 / Confirmed  Snoring disorder / ICD-9-.09 / Confirmed  Carpal tunnel syndrome, left / ICD-9-.0 / Confirmed  Multiple pulmonary emboli / SNOMED CT 19479711 / Confirmed  Bilateral pulmonary embolism / SNOMED CT 32933099 / Confirmed      Histories   Past Medical History:    Active  GERD (Gastroesophageal Reflux Disease) (530.81)  Low HDL (under 40) (641580437)  HTN (Hypertension) (401.9)  Seasonal Allergies (477.9)  Comments:  12/19/2012 CST  9:33 AM Peace Qureshi  Stable.  Back pain, chronic (724.5)  Comments:  12/19/2012 CST 9:54 AM Peace Qureshi  An MRI has been done previously, maybe in 2005.  Snoring disorder (786.09)  Comments:  12/19/2012 CST 9:40 AM Peace Qureshi  ENT evaluation by Dr. Jaleel Douglass on 05/07/2008.  A sleep study was performed; there was no evidence of apnea.    12/19/2012 CST 9:44 AM Peace Qureshi  This began after a severe pharyngitis.  Carpal tunnel syndrome, left (354.0)  Comments:  12/19/2012 CST 9:49 AM Peace Qureshi  Marcaine and Celestone injection done on 08/17/2007.  Resolved  *Hospitalized@[facility] - Bilateral pulmonary emboli: Onset on 5/15/2014 at 43 years.  Resolved on 5/16/2014 at 43 years.  Folliculitis (56253125): Onset in the month of 4/2008 at 37 years  Resolved.  Comments:  12/19/2012 CST 9:46 AM Peace Qureshi  Right thigh.  Irregular Heartbeat (427.9): Onset on 4/17/2006 at 35 years.  Resolved.  Comments:  12/19/2012 CST 9:55 AM Peace Qureshi  Holter monitor placed.   Family History:    Carpal Tunnel Syndrome  Mother  Asthma  Grandfather (P)  Daughter (Ceneelima)  Hypertension  Mother  Heart disease  Father  CA - Cancer  Mother  Comments:  1/12/2016 3:42 PM - Angeline Kong  skin  Hypercholesterolemia  Mother  Parkinson disease  Mother  HTN - Hypertension  Father  Back  Mother  Father  Prostate cancer..  Uncle  Comments:  12/19/2012 9:23 AM - Peace Ibarra  Prostate cancer in his 60s.  ASHD - Atherosclerotic heart disease  Mother     Procedure history:    Cardiac computed tomography for calcium scoring (SNOMED CT 0418690335) on 1/24/2017 at 46 Years.  Comments:  2/2/2017 12:14 PM - Adriane Hayden  Total Agatston calcium score is 18  Vasectomy (SNOMED CT 93333580) on 2/20/2009 at 38 Years.  Polysomnogram (SNOMED CT 270314957) on 4/21/2008 at 37 Years.  Comments:  12/19/2012 10:00 Peace Rebollar  Normal sleep architecture.  No evidence of obstructive sleep apnea.  Wrist injection (SNOMED CT  686127342) on 8/17/2007 at 36 Years.  Comments:  12/19/2012 9:50 AM - Peace Ibarra  Marcaine and Celestone injection, left wrist, for carpal tunnel symptoms.  Holter monitor (SNOMED CT 963198504) performed by Britton Pepe MD on 4/20/2006 at 35 Years.  Comments:  12/19/2012 10:03 AM - Peace Ibarra  Single 7-beat episode of asymptomatic slow atrial tachycardia with a rate of 102.  No other significant dysrhythmia.  Single episode of patient symptoms without dysrhythmia.  Cellulitis (SNOMED CT 1022148094) in 2005 at 35 Years.  Stress test ECG - treadmill (SNOMED CT 006037946) on 6/7/2005 at 34 Years.  Comments:  12/19/2012 10:06 AM - Peace Ibarra  Performed at [facility] for non-exertional chest discomfort.  Adenoidectomy (SNOMED CT 237206809).  Tonsillectomy (SNOMED CT 101060274).  Lymph node removal from axilla.   Social History:        Alcohol Assessment: Current            Current                     Comments:                      03/08/2017 - Adelia Manzo                     1-2 drinks 2-3 times per week      Tobacco Assessment: Denies Tobacco Use            Never      Substance Abuse Assessment: Denies Substance Abuse            Never      Employment and Education Assessment            Employed, Work/School description: .      Home and Environment Assessment            Marital status: .  Spouse/Partner name: Mera Caballero.  3 children.      Nutrition and Health Assessment            Type of diet: Regular.        Physical Examination   Vital Signs   5/3/2017 1:43 PM CDT Temperature Tympanic 97.3 DegF  LOW    Peripheral Pulse Rate 72 bpm    Systolic Blood Pressure 122 mmHg    Diastolic Blood Pressure 60 mmHg    Mean Arterial Pressure 81 mmHg      Measurements from flowsheet : Measurements   5/3/2017 1:43 PM CDT Height Measured - Standard 73 in    Weight Measured - Standard 262.0 lb    BSA 2.47 m2    Body Mass Index 34.56 kg/m2      General:  Alert and oriented, No acute distress.     Eye:  Pupils are equal, round and reactive to light, Extraocular movements are intact, Vision unchanged.         Eyelids: Within normal limits.         Conjunctiva: Both eyes, With conjunctivitis.    Neurologic:  Alert, Oriented.       Impression and Plan   Diagnosis     Acute allergic conjunctivitis (MJS89-BA H10.10).     Course:  Not progressing as expected.    Plan:  see drops  fu 1 week if not better sooner if worse  .    Patient Instructions:       Counseled: Patient, Regarding diagnosis, Regarding treatment, Regarding medications.

## 2022-02-16 NOTE — NURSING NOTE
Comprehensive Intake Entered On:  1/21/2020 9:50 AM CST    Performed On:  1/21/2020 9:49 AM CST by Charlotte Villalba               Summary   Chief Complaint :   Px   Weight Measured :   266.6 lb(Converted to: 266 lb 10 oz, 120.93 kg)    Height Measured :   73 in(Converted to: 6 ft 1 in, 185.42 cm)    Body Mass Index :   35.17 kg/m2 (HI)    Body Surface Area :   2.49 m2   Systolic Blood Pressure :   105 mmHg   Diastolic Blood Pressure :   67 mmHg   Mean Arterial Pressure :   80 mmHg   Peripheral Pulse Rate :   72 bpm   BP Method :   Electronic   HR Method :   Electronic   Temperature Tympanic :   97.9 DegF(Converted to: 36.6 DegC)    Charlotte Villalba - 1/21/2020 9:49 AM CST   Health Status   Allergies Verified? :   Yes   Medication History Verified? :   Yes   Pre-Visit Planning Status :   Completed   Tobacco Use? :   Never smoker   Charlotte Villalba - 1/21/2020 9:49 AM CST

## 2022-02-16 NOTE — NURSING NOTE
Comprehensive Intake Entered On:  1/26/2021 8:52 AM CST    Performed On:  1/26/2021 8:51 AM CST by Charlotte Villalba               Summary   Chief Complaint :   Px   Weight Measured :   278.6 lb(Converted to: 278 lb 10 oz, 126.371 kg)    Height Measured :   73 in(Converted to: 6 ft 1 in, 185.42 cm)    Body Mass Index :   36.75 kg/m2 (HI)    Body Surface Area :   2.55 m2   Systolic Blood Pressure :   120 mmHg   Diastolic Blood Pressure :   82 mmHg (HI)    Mean Arterial Pressure :   95 mmHg   Peripheral Pulse Rate :   72 bpm   BP Method :   Electronic   HR Method :   Electronic   Temperature Tympanic :   97.5 DegF(Converted to: 36.4 DegC)  (LOW)    Charlotte Villalba - 1/26/2021 8:51 AM CST   Health Status   Allergies Verified? :   Yes   Medication History Verified? :   Yes   Tobacco Use? :   Never smoker   Charlotte Villalba - 1/26/2021 8:51 AM CST   ID Risk Screen   Recent Travel History :   No recent travel   Family Member Travel History :   No recent travel   Other Exposure to Infectious Disease :   Unknown   COVID-19 Testing Status :   No positive COVID-19 test   Charlotte Villalba - 1/26/2021 8:51 AM CST   Social History   Social History   (As Of: 1/26/2021 8:52:37 AM CST)   Alcohol:        Current, Liquor (Hard) (1.5 oz), Daily, 1 drinks/episode average.  Ready to change: No.   (Last Updated: 1/22/2020 9:51:54 AM CST by Blanche Schroeder)          Tobacco:        Never (less than 100 in lifetime)   (Last Updated: 1/26/2021 8:51:12 AM CST by Charlotte Villalba)          Electronic Cigarette/Vaping:        Electronic Cigarette Use: Never.   (Last Updated: 1/26/2021 8:51:16 AM CST by Charlotte Villalba)          Substance Abuse:        Never   (Last Updated: 12/19/2012 9:19:14 AM CST by Peace Ibarra)          Employment/School:        Employed, Work/School description: .   (Last Updated: 3/8/2017 8:41:36 PM CST by Adelia Manzo)          Home/Environment:        Marital status: .  Spouse/Partner name:  Mera Caballero.  3 children.  Living situation: Home/Independent.  Injuries/Abuse/Neglect in household: No.  Feels unsafe at home: No.  Family/Friends available for support: Yes.  Risks in environment: owns secured gun.   (Last Updated: 1/22/2020 9:53:38 AM CST by Blanche Schroeder)          Nutrition/Health:        Type of diet: Regular.  Wants to lose weight: Yes.  Sleeping concerns: No.  Feels highly stressed: No.   (Last Updated: 1/22/2020 9:53:55 AM CST by Blanche Schroeder)          Exercise:        Exercise frequency: 3-4 times/week.  Exercise type: Karate.   (Last Updated: 1/22/2020 9:54:15 AM CST by Blanche Schroeder)          Sexual:        Sexually active: Yes.  Identifies as male, Sexual orientation: Straight or heterosexual.  History of STD: No.  Contraceptive Use Details: vasectomy, wife had hysterectomy.  History of sexual abuse: No.   (Last Updated: 1/22/2020 9:55:53 AM CST by Blanche Schroeder)

## 2022-02-16 NOTE — PROGRESS NOTES
Patient:   JUAN MATHEWS            MRN: [id number]            FIN: [id number]               Age:   49 years     Sex:  Male     :  1970   Associated Diagnoses:   Right shoulder pain; Subacromial bursitis   Author:   Juan Garcia MD      Impression and Plan   Diagnosis     Right shoulder pain (AQG14-MB M25.511).     Subacromial bursitis (SHI78-BY M75.51).     Course:  Worsening.    Plan:  steroid injection  follow up for MRI if not getting better.    Orders     Orders   Charges (Evaluation and Management):  29528 office outpatient visit 25 minutes (Charge) (Order): Quantity: 1, Right shoulder pain.     Orders (Selected)   Outpatient Orders  Ordered  XR Shoulder Right (Request): Right shoulder pain.        Visit Information   Visit type:  New symptom.    Accompanied by:  No one.    Source of history:  Self.    History limitation:  None.       Chief Complaint   2020 9:16 AM CDT    Pt here for right shoulder pain        History of Present Illness             The patient presents with shoulder problem.  The location of the shoulder problem is the right shoulder.  The shoulder problem is characterized by pain.  The severity of the shoulder problem is moderate.  The timing/course of the symptom(s) related to the shoulder problem is constant.  The shoulder problem occurred 3 week(s) ago.  Radiation of pain: none.  The context of the shoulder problem: occurred after swimming.  Exacerbating factors consist of lifting and movement.  Relieving factors consist of ice/cool pack and rest.  Associated symptoms consist of none.  Prior treatment consists of none.        Review of Systems   Constitutional:  Negative.    Eye:  Negative.    Ear/Nose/Mouth/Throat:  Negative.    Respiratory:  Negative.    Cardiovascular:  Negative.    Gastrointestinal:  Negative.    Genitourinary:  Negative.    Hematology/Lymphatics:  Negative.    Endocrine:  Negative.    Immunologic:  Negative.    Musculoskeletal:  Negative except as  documented in history of present illness.    Integumentary:  Negative.    Neurologic:  Negative.    Psychiatric:  Negative.    All other systems reviewed and negative      Health Status   Allergies:    Allergic Reactions (Selected)  Severity Not Documented  Cats (No reactions were documented)  Citalopram (Hives)   Medications:  (Selected)   Prescriptions  Prescribed  Lipitor 20 mg oral tablet: = 1 tab(s) ( 20 mg ), PO, Daily, # 90 tab(s), 3 Refill(s), Type: Maintenance, Pharmacy: [facility], 1 tab(s) Oral daily  Pepcid 40 mg oral tablet: = 1 tab(s) ( 40 mg ), Oral, daily, # 90 tab(s), 3 Refill(s), Type: Maintenance, Pharmacy: [facility], 1 tab(s) Oral daily  Xarelto 20 mg oral tablet: = 1 tab(s) ( 20 mg ), po, qpm, # 90 tab(s), 3 Refill(s), Type: Maintenance, Pharmacy: [facility], 1 tab(s) Oral qpm  clotrimazole 1% topical cream: 1 [place], Topical, bid, # 60 gm, 11 Refill(s), Type: Maintenance, Pharmacy: [facility], 1 modesto Topical bid  cyclobenzaprine 10 mg oral tablet: = 1 tab(s) ( 10 mg ), Oral, tid, PRN: for pain, # 30 tab(s), 1 Refill(s), Type: Soft Stop, Pharmacy: [facility], 1 tab(s) Oral tid,PRN:for pain  ketorolac 0.5% ophthalmic solution: 1 drop(s), Eye-Both, qid, PRN: as needed for itching, # 5 mL, 11 Refill(s), Type: Soft Stop, Pharmacy: [facility], 1 drop(s) Eye-Both qid,PRN:as needed for itching  olopatadine 0.1% ophthalmic solution: 1 drop(s), both eyes, bid, PRN: for allergy symptoms, # 5 mL, 11 Refill(s), Type: Maintenance, Pharmacy: [facility], 1 drop(s) Eye-Both bid,PRN:for allergy symptoms  traMADol 50 mg oral tablet: 1 tab(s) ( 50 mg ), PO, q4hr, PRN: for pain, # 60 tab(s), 1 Refill(s), Type: Maintenance, called to pharmacy (Rx)  venlafaxine 150 mg oral capsule, extended release: = 1 cap(s) ( 150 mg ), po, daily, # 90 cap(s), 3 Refill(s), Type: Maintenance, Pharmacy: [facility], 1 cap(s) Oral daily  Documented Medications  Documented  Claritin 10 mg  oral tablet: 1 tab(s) ( 10 mg ), po, daily, 0 Refill(s), Type: Maintenance  Flonase 50 mcg/inh nasal spray: 2 spray(s), nasal, daily, 0 Refill(s), Type: Maintenance  Multiple Vitamins oral tablet: 1 tab(s), PO, Daily, 0 Refill(s)  Tylenol: PO, 0 Refill(s)   Problem list:    All Problems (Selected)  Back pain, chronic / SNOMED CT 741833745 / Confirmed  Carpal tunnel syndrome, left / SNOMED CT 79879864 / Confirmed  Dysthymia / SNOMED CT 490718308 / Confirmed  GERD (gastroesophageal reflux disease) / SNOMED CT 722119809 / Confirmed  Hx pulmonary embolism / SNOMED CT 602019270 / Confirmed  Low HDL (under 40) / SNOMED CT 276035801 / Confirmed  Monoclonal gammopathy / SNOMED CT 032529621 / Confirmed  Obese / SNOMED CT 5907578537 / Probable  Seasonal allergies / SNOMED CT 733021996 / Confirmed  Snoring disorder / ICD-9-.09 / Confirmed      Histories   Past Medical History:    Active  Dysthymia (394001742): Onset on 7/22/2010 at 39 years.  Comments:  7/22/2010 CDT 1:18 PM CDT -     GERD (gastroesophageal reflux disease) (090603764)  Low HDL (under 40) (768871712)  Obese (7693394369)  Seasonal allergies (457729274)  Comments:  12/19/2012 CST 9:33 AM Peace Qureshi  Stable.  Back pain, chronic (416750172)  Comments:  12/19/2012 CST 9:54 AM Peace Qureshi  An MRI has been done previously, maybe in 2005.  Snoring disorder (786.09)  Comments:  12/19/2012 CST 9:40 AM Peace Qureshi  ENT evaluation by Dr. Jaleel Douglass on 05/07/2008.  A sleep study was performed; there was no evidence of apnea.    12/19/2012 CST 9:44 AM Peace Qureshi  This began after a severe pharyngitis.  Carpal tunnel syndrome, left (49666103)  Comments:  12/19/2012 CST 9:49 AM Peace Qureshi  Marcaine and Celestone injection done on 08/17/2007.  Resolved  *[email or web address]@[facility] - Bilateral pulmonary emboli: Onset on 5/15/2014 at 43 years.  Resolved on 5/16/2014 at 43 years.  Folliculitis (85780833): Onset in the month of 4/2008 at 37 years   Resolved.  Comments:  12/19/2012 CST 9:46 AM Peace Qureshi  Right thigh.  Irregular heartbeat (474323275): Onset on 4/17/2006 at 35 years.  Resolved.  Comments:  12/19/2012 CST 9:55 AM Peace Qureshi  Holter monitor placed.  Multiple pulmonary emboli (60788330):  Resolved.  Bilateral pulmonary embolism (35963518):  Resolved.   Family History:    Carpal Tunnel Syndrome  Mother  Asthma  Grandfather (P)  Daughter (Jose)  Hypertension  Mother  Heart disease  Father  CA - Cancer  Mother  Comments:  1/12/2016 3:42 PM Angeline Dawson  skin  Hypercholesterolemia  Mother  Parkinson disease  Mother  HTN - Hypertension  Father  Back  Mother  Father  Prostate cancer..  Uncle  Comments:  12/19/2012 9:23 AM Peace Qureshi  Prostate cancer in his 60s.  GERD - Gastro-esophageal reflux disease  Sister  ASHD - Atherosclerotic heart disease  Mother     Procedure history:    Screening for colon cancer (SNOMED CT 0832597264) performed by Silvio Burns MD on 3/19/2019 at 48 Years.  Comments:  3/26/2019 9:05 AM [facility]ON[facility] - Shanika Shipman MA result:  negative  Recommendation:  f/u 3yrs  Cardiac computed tomography for calcium scoring (SNOMED CT 4275607918) on 1/24/2017 at 46 Years.  Comments:  2/2/2017 12:14 PM Adriane Ayala  Total Agatston calcium score is 18  Vasectomy (SNOMED CT 91504517) on 2/20/2009 at 38 Years.  Polysomnogram (SNOMED CT 092984386) on 4/21/2008 at 37 Years.  Comments:  12/19/2012 10:00 AM Peace Qureshi  Normal sleep architecture.  No evidence of obstructive sleep apnea.  Wrist injection (SNOMED CT 807797059) on 8/17/2007 at 36 Years.  Comments:  12/19/2012 9:50 AM Peace Qureshi  Marcaine and Celestone injection, left wrist, for carpal tunnel symptoms.  Holter monitor (SNOMED CT 474883929) performed by Britton Pepe MD on 4/20/2006 at 35 Years.  Comments:  12/19/2012 10:03 AM Peace Qureshi  Single 7-beat episode of asymptomatic slow atrial tachycardia with a rate of  102.  No other significant dysrhythmia.  Single episode of patient symptoms without dysrhythmia.  Cellulitis (SNOMED CT 9801821866) in 2005 at 35 Years.  Stress test ECG - treadmill (SNOMED CT 109131926) on 6/7/2005 at 34 Years.  Comments:  12/19/2012 10:06 AM CST - Peace Ibarra  Performed at [facility] for non-exertional chest discomfort.  Adenoidectomy (SNOMED CT 081420191).  Tonsillectomy (SNOMED CT 966620878).  Lymph node removal from axilla.   Social History:        Alcohol Assessment            Current, Liquor (Hard) (1.5 oz), Daily, 1 drinks/episode average.  Ready to change: No.      Tobacco Assessment            Never      Substance Abuse Assessment            Never      Employment and Education Assessment            Employed, Work/School description: .      Home and Environment Assessment            Marital status: .  Spouse/Partner name: Mera Caballero.  3 children.  Living situation:               Home/Independent.  Injuries/Abuse/Neglect in household: No.  Feels unsafe at home: No.  Family/Friends               available for support: Yes.  Risks in environment: owns secured gun.      Nutrition and Health Assessment            Type of diet: Regular.  Wants to lose weight: Yes.  Sleeping concerns: No.  Feels highly stressed: No.      Exercise and Physical Activity Assessment            Exercise frequency: 3-4 times/week.  Exercise type: Karate.      Sexual Assessment            Sexually active: Yes.  Identifies as male, Sexual orientation: Straight or heterosexual.  History of STD: No.               Contraceptive Use Details: vasectomy, wife had hysterectomy.  History of sexual abuse: No.        Physical Examination   Vital Signs   6/16/2020 9:16 AM CDT Temperature Tympanic 97.6 DegF  LOW    Peripheral Pulse Rate 70 bpm    Systolic Blood Pressure 134 mmHg    Diastolic Blood Pressure 86 mmHg    Mean Arterial Pressure 102 mmHg    Oxygen Saturation 96 %      Measurements from  flowsheet : Measurements   6/16/2020 9:16 AM CDT Height Measured - Standard 73 in    Weight Measured - Standard 266 lb    BSA 2.49 m2    Body Mass Index 35.09 kg/m2  HI      General:  Alert and oriented X 3, No acute distress, Warm, Pink, Intact.         Appearance: Within normal limits, Well nourished, Calm.         Hydration: Within normal limits.         Psych: Within normal limits, Appropriate mood and affect, Cooperative, Normal judgment.    Musculoskeletal:       Upper extremity exam: Shoulder ( Right, No deformity, No erythema, No ecchymosis, No mass, No nodule, No swelling, Tenderness, No wound, No crepitus, No numbness, Strength  5 /5, Normal range of motion, Impingement tests ( Positive ( On the right ) ) ).       Review / Management   Radiology results   X-ray, Two views right shoulder , Reveals no acute disease process, Radiograph(s) result as interpreted by ordering provider reviewed with patient.  Radiologist will also interpret the radiograph(s) and patient will be informed if result is modified when both interpretations are compared.

## 2022-02-16 NOTE — PROGRESS NOTES
Patient:   VIRGIL MATHEWS            MRN: [id number]            FIN: [id number]               Age:   46 years     Sex:  Male     :  1970   Associated Diagnoses:   Annual exam; Obese; Hypercholesteremia; HTN (Hypertension); Dysthymia; Bilateral pulmonary embolism   Author:   Silvio Burns MD      Visit Information      Date of Service: 2017 08:46 am  Performing Location: [facility]  Encounter#: [id number]      Primary Care Provider (PCP):  Silvio Burns MD    NPI# [id number]      Referring Provider:  No referring provider recorded for selected visit.   Visit type:  Annual exam.    Accompanied by:  No one.    Source of history:  Self.    History limitation:  None.       Chief Complaint   2017 8:53 AM CST    Px      Well Adult History   Well Adult History             The patient presents for well adult exam, fu htn and pe.  The patient's general health status is described as good.  The patient's diet is described as balanced.  Exercise: occasional.  Associated symptoms consist of none.  Complaint: Taking medications as directed   long term xarelto for recurrent pe   tinea pedis long term  Seeing [facility] for possible monoclonal jm Bone Marrow bx tomorrow.  Additional pertinent history: occasional caffeine use, tobacco use none and alcohol use socially.  Notes.            Review of Systems   Constitutional:  No fever, No chills, No sweats, No weakness, No fatigue.    Eye:  No recent visual problem.    Ear/Nose/Mouth/Throat:  No sore throat.    Respiratory:  No shortness of breath, No cough, No sputum production.    Cardiovascular:  No chest pain, No peripheral edema.    Gastrointestinal:  Negative except as documented in history of present illness, No nausea, No vomiting, No diarrhea, No constipation.    Genitourinary:  No dysuria, No hematuria.    Musculoskeletal:  Negative except as documented in history of present illness, No back pain, No muscle pain.    Integumentary:   Negative except as documented in history of present illness, No rash.    Neurologic:  Alert and oriented X4.       Health Status   Allergies:    Allergic Reactions (Selected)  Severity Not Documented  Cats (No reactions were documented)  Citalopram (Hives)   Medications:  (Selected)   Prescriptions  Prescribed  Flexeril 10 mg oral tablet: 1 tab(s) ( 10 mg ), PO, TID, PRN: for pain, # 30 tab(s), 1 Refill(s), Type: Maintenance, called to pharmacy (Rx), 1 tab(s) po tid,PRN:for pain  Pataday 0.2% ophthalmic solution: 1 drop(s), both eyes, daily, # 2.5 mL, 5 Refill(s), Type: Soft Stop, Pharmacy: [facility], 1 drop(s) both eyes daily  Xarelto 20 mg oral tablet: 1 tab(s) ( 20 mg ), po, qpm, # 90 tab(s), 1 Refill(s), Type: Maintenance, Pharmacy: [facility], 1 tab(s) po qpm  econazole 1% topical cream: 1 modesto, TOP, BID, # 85 g, 5 Refill(s), Type: Maintenance, Pharmacy: [facility], 1 modesto top bid  lisinopril 10 mg oral tablet: 1 tab(s) ( 10 mg ), po, daily, # 90 tab(s), 1 Refill(s), Type: Maintenance, Pharmacy: [facility], 1 tab(s) po daily  raNITIdine 300 mg oral tablet: 1 tab(s) ( 300 mg ), po, daily, # 90 tab(s), 1 Refill(s), Type: Maintenance, Pharmacy: [facility], 1 tab(s) po daily  traMADol 50 mg oral tablet: 1 tab(s) ( 50 mg ), PO, q4hr, PRN: for pain, # 60 tab(s), 1 Refill(s), Type: Maintenance, called to pharmacy (Rx)  venlafaxine 150 mg oral capsule, extended release: 1 cap(s) ( 150 mg ), po, daily, # 90 cap(s), 1 Refill(s), Type: Maintenance, Pharmacy: [facility], 1 cap(s) po daily  Documented Medications  Documented  Claritin 10 mg oral tablet: 1 tab(s) ( 10 mg ), po, daily, 0 Refill(s), Type: Maintenance  Flonase 50 mcg/inh nasal spray: 2 spray(s), nasal, daily, 0 Refill(s), Type: Maintenance  Multiple Vitamins oral tablet: 1 tab(s), PO, Daily, 0 Refill(s)  Tylenol: PO, 0 Refill(s)   Problem list:    All Problems  GERD (Gastroesophageal Reflux Disease) / ICD-9-.81 /  Confirmed  Low HDL (under 40) / SNOMED CT 412398158 / Confirmed  HTN (Hypertension) / ICD-9-.9 / Confirmed  Dysthymia / ICD-9-.4 / Confirmed  Obese / ICD-9-.00 / Probable  Seasonal Allergies / ICD-9-.9 / Confirmed  Back pain, chronic / ICD-9-.5 / Confirmed  Snoring disorder / ICD-9-.09 / Confirmed  Carpal tunnel syndrome, left / ICD-9-.0 / Confirmed  Multiple pulmonary emboli / SNOMED CT 10378511 / Confirmed  Bilateral pulmonary embolism / SNOMED CT 13861156 / Confirmed  Inactive: Tinea pedis / SNOMED CT 34747976  Resolved: Folliculitis / SNOMED CT 41131774  Resolved: Irregular Heartbeat / ICD-9-.9  Resolved: *Hospitalized@[facility] - Bilateral pulmonary emboli      Histories   Past Medical History:    Active  GERD (Gastroesophageal Reflux Disease) (530.81)  Low HDL (under 40) (173218247)  HTN (Hypertension) (401.9)  Seasonal Allergies (477.9)  Comments:  12/19/2012 CST 9:33 AM Peace Qureshi  Stable.  Back pain, chronic (724.5)  Comments:  12/19/2012 CST 9:54 AM Peace Qureshi  An MRI has been done previously, maybe in 2005.  Snoring disorder (786.09)  Comments:  12/19/2012 CST 9:40 AM Peace Qureshi  ENT evaluation by Dr. Jaleel Douglass on 05/07/2008.  A sleep study was performed; there was no evidence of apnea.    12/19/2012 CST 9:44 AM Peace Qureshi  This began after a severe pharyngitis.  Carpal tunnel syndrome, left (354.0)  Comments:  12/19/2012 CST 9:49 AM Peace Qureshi  Marcaine and Celestone injection done on 08/17/2007.  Resolved  *Hospitalized@[facility] - Bilateral pulmonary emboli: Onset on 5/15/2014 at 43 years.  Resolved on 5/16/2014 at 43 years.  Folliculitis (64383043): Onset in the month of 4/2008 at 37 years  Resolved.  Comments:  12/19/2012 CST 9:46 AM CST - Ibarra , Peace  Right thigh.  Irregular Heartbeat (427.9): Onset on 4/17/2006 at 35 years.  Resolved.  Comments:  12/19/2012 CST 9:55 AM ANIYAH - Peace Ibarra  Holter monitor placed.   Family  History:    Carpal Tunnel Syndrome  Mother  Asthma  Grandfather (P)  Daughter (Jose)  Hypertension  Mother  Heart disease  Father  CA - Cancer  Mother  Comments:  1/12/2016 3:42 PM - Angeline Kong  skin  Hypercholesterolemia  Mother  Parkinson disease  Mother  HTN - Hypertension  Father  Back  Mother  Father  Prostate cancer..  Uncle  Comments:  12/19/2012 9:23 AM - Peace Ibarra  Prostate cancer in his 60s.  ASHD - Atherosclerotic heart disease  Mother     Procedure history:    Vasectomy (SNOMED CT 26926327) on 2/20/2009 at 38 Years.  Polysomnogram (SNOMED CT 078560744) on 4/21/2008 at 37 Years.  Comments:  12/19/2012 10:00 AM - Peace Ibarra  Normal sleep architecture.  No evidence of obstructive sleep apnea.  Wrist injection (SNOMED CT 682805432) on 8/17/2007 at 36 Years.  Comments:  12/19/2012 9:50 AM - Peace Ibarra  Marcaine and Celestone injection, left wrist, for carpal tunnel symptoms.  Holter monitor (SNOMED CT 158939535) performed by Britton Pepe MD on 4/20/2006 at 35 Years.  Comments:  12/19/2012 10:03 AM - Peace Ibarra  Single 7-beat episode of asymptomatic slow atrial tachycardia with a rate of 102.  No other significant dysrhythmia.  Single episode of patient symptoms without dysrhythmia.  Cellulitis (SNOMED CT 0080409152) in 2005 at 35 Years.  Stress test ECG - treadmill (SNOMED CT 977999540) on 6/7/2005 at 34 Years.  Comments:  12/19/2012 10:06 AM - Peace Ibarra  Performed at [facility] for non-exertional chest discomfort.  Adenoidectomy (SNOMED CT 064515854).  Tonsillectomy (SNOMED CT 737566144).  Lymph node removal from axilla.   Social History:        Alcohol Assessment            Current, 1 drink, Daily      Tobacco Assessment            Never      Substance Abuse Assessment            Never      Employment and Education Assessment            Employed, Work/School description: .      Home and Environment Assessment            Marital status: .  Spouse/Partner name:  Mera.  3 children.      Nutrition and Health Assessment            Type of diet: Regular.      Exercise and Physical Activity Assessment            Exercise frequency: 2-3 times per wk..  Exercise type: Karate 2x per week  Stretching 5x per week.        Physical Examination   Vital Signs   1/12/2017 8:53 AM CST Temperature Tympanic 97.6 DegF  LOW    Peripheral Pulse Rate 77 bpm    Systolic Blood Pressure 120 mmHg    Diastolic Blood Pressure 77 mmHg    Mean Arterial Pressure 91 mmHg      Measurements from flowsheet : Measurements   1/12/2017 8:53 AM CST Height Measured - Standard 73 in    Weight Measured - Standard 290.8 lb    BSA 2.6 m2    Body Mass Index 38.36 kg/m2      General:  Alert and oriented, No acute distress.    Eye:  Pupils are equal, round and reactive to light, Extraocular movements are intact.    HENT:  Normocephalic, Tympanic membranes are clear, Normal hearing, Oral mucosa is moist.    Neck:  Supple, Non-tender, No carotid bruit, No jugular venous distention, No lymphadenopathy, No thyromegaly.    Respiratory:  Lungs are clear to auscultation, Respirations are non-labored, Breath sounds are equal.    Cardiovascular:  Normal rate, Regular rhythm, No murmur, Good pulses equal in all extremities, No edema.    Gastrointestinal:  Soft, Non-distended, Normal bowel sounds, No organomegaly, Epigastric tenderness.    Lymphatics:  No lymphadenopathy neck, axilla, groin.    Musculoskeletal:  Normal range of motion, Normal strength, No tenderness, No swelling, degenerative changes noted.    Integumentary:  Warm, Dry, tinea pedis.    Neurologic:  Alert, Oriented, Normal sensory, Normal motor function, No focal deficits, Cranial Nerves II-XII are grossly intact, Normal deep tendon reflexes.    Psychiatric:  Cooperative, Appropriate mood & affect, Normal judgment.       Review / Management   Results review:  Lab results   1/5/2017 8:12 AM CST Sodium Level 140 mmol/L    Potassium Level 4.6 mmol/L    Chloride  Level 105 mmol/L    CO2 Level 28 mmol/L    Glucose Level 113 mg/dL  HI    BUN 17 mg/dL    Creatinine Level 1.04 mg/dL    BUN/Creat Ratio NOT APPLICABLE    eGFR 85 mL/min/1.73m2    eGFR African American 98 mL/min/1.73m2    Calcium Level 9.2 mg/dL    Cholesterol 196 mg/dL    Non-HDL Cholesterol 167  HI    HDL 29 mg/dL  LOW    Cholesterol/HDL Ratio 6.8  HI        Triglyceride 243 mg/dL  HI   11/9/2016 3:34 PM CST Protein Electrophoresis Interp INTERPRETATION    Protein Total 7.7 gm/dL    Albumin Level 4.7 gm/dL    Hgb A1c 5.8  HI    Vitamin B12 Level 504 pg/mL    Immunofixation An IgG (lambda) monoclonal immunoglobulin is detected.    Alpha 1 Globulin 0.3 gm/dL    Alpha 2 Globulin 0.6 gm/dL    Beta 1 Globulin 0.5 gm/dL    Beta 2 Globulin 0.4 gm/dL    Gamma Globulin 1.3 gm/dL    Abnormal Band 0.7 gm/dL  HI   .       Impression and Plan   Diagnosis     Annual exam (BNT83-NV Z00.00).     Obese (XHU71-ZG E66.9).     Hypercholesteremia (VEG55-JT E78.0).     HTN (Hypertension) (PMZ24-TU I10).     Dysthymia (OKD48-XH F34.1).     Bilateral pulmonary embolism (NCK61-YW I26.99).     Course:  Progressing as expected.    Plan:  BMI,Weight loss,exercise,diet discussed, wound care reviewed  bleeding risk reviewed  skin care reviewed  fu 1 year.         Follow-up: With Primary Care Provider, Return to clinic, In 6 months.    Patient Instructions:       Counseled: Patient, Regarding diagnosis, Regarding medications, Verbalized understanding.

## 2022-02-16 NOTE — TELEPHONE ENCOUNTER
Entered by Yazmin Mancuso CMA on January 20, 2021 1:33:34 PM CST  ---------------------  From: Yazmin Mancuso CMA   To: [facility]    Sent: 1/20/2021 1:33:34 PM CST  Subject: Medication Management     ** Submitted: **  Order:famotidine (famotidine 40 mg oral tablet)  1 tab(s)  Oral  daily  Qty:  30 tab(s)        Refills:  0          Substitutions Allowed     Route To [facility]    Signed by Yazmin Mancuso CMA  1/20/2021 7:32:00 PM UT    ** Submitted: **  Complete:famotidine (famotidine 40 mg oral tablet)   Signed by Yazmin Mancuso CMA  1/20/2021 7:33:00 PM UT    ** Not Approved:  **  famotidine (FAMOTIDINE 40MG TABS)  TAKE ONE TABLET BY MOUTH EVERY DAY  Qty:  90 unknown unit        Days Supply:  90        Refills:  0          Substitutions Allowed     Route To [facility]   Signed by Yazmin Mancuso CMA            ** Submitted: **  Order:atorvastatin (atorvastatin 20 mg oral tablet)  1 tab(s)  Oral  daily  Qty:  30 tab(s)        Refills:  0          Substitutions Allowed     Route To [facility]    Signed by Yazmin Mancuso CMA  1/20/2021 7:32:00 PM UT    ** Submitted: **  Complete:atorvastatin (Lipitor 20 mg oral tablet)   Signed by Yazmin Mancuso CMA  1/20/2021 7:32:00 PM UTC    ** Not Approved:  **  atorvastatin (ATORVASTATIN 20MG TABS)  TAKE ONE TABLET BY MOUTH EVERY DAY  Qty:  90 unknown unit        Days Supply:  90        Refills:  3          Substitutions Allowed     Route To [facility]   Signed by Yazmin Mancuso CMA            ------------------------------------------  From: [facility]  To: Silvio Burns MD  Sent: January 20, 2021 8:20:47 AM CST  Subject: Medication Management  Due: January 15, 2021 8:19:52 PM CST     ** On Hold Pending Signature **     Drug: atorvastatin (atorvastatin 20 mg oral tablet), TAKE ONE TABLET BY MOUTH EVERY DAY  Quantity: 90 unknown unit  Days Supply: 90  Refills: 2  Substitutions Allowed  Notes from Pharmacy:      Dispensed Drug: atorvastatin (atorvastatin 20 mg oral tablet), TAKE ONE TABLET BY MOUTH EVERY DAY  Quantity: 90 unknown unit  Days Supply: 90  Refills: 3  Substitutions Allowed  Notes from Pharmacy:     ** On Hold Pending Signature **     Drug: famotidine (famotidine 40 mg oral tablet), TAKE ONE TABLET BY MOUTH EVERY DAY  Quantity: 90 unknown unit  Days Supply: 90  Refills: 0  Substitutions Allowed  Notes from Pharmacy:     Dispensed Drug: famotidine (famotidine 40 mg oral tablet), TAKE ONE TABLET BY MOUTH EVERY DAY  Quantity: 90 unknown unit  Days Supply: 90  Refills: 0  Substitutions Allowed  Notes from Pharmacy:  ------------------------------------------1/26/21 murali appt scheduled

## 2022-02-16 NOTE — NURSING NOTE
Comprehensive Intake Entered On:  5/16/2019 10:32 AM CDT    Performed On:  5/16/2019 10:31 AM CDT by Charlotte Villalba               Summary   Chief Complaint :   f/up cyst on right hand/finger.  Red spot on forehead.    Weight Measured :   252.8 lb(Converted to: 252 lb 13 oz, 114.67 kg)    Systolic Blood Pressure :   114 mmHg   Diastolic Blood Pressure :   77 mmHg   Mean Arterial Pressure :   89 mmHg   Peripheral Pulse Rate :   52 bpm (LOW)    BP Method :   Electronic   HR Method :   Electronic   Temperature Tympanic :   97.0 DegF(Converted to: 36.1 DegC)  (LOW)    Charlotte Villalba - 5/16/2019 10:31 AM CDT   Meds / Allergies   (As Of: 5/16/2019 10:32:27 AM CDT)   Allergies (Active)   Cats  Estimated Onset Date:   Unspecified ; Created By:   Peace Ibarra; Reaction Status:   Active ; Category:   Drug ; Substance:   Cats ; Type:   Allergy ; Updated By:   Peace Ibarra; Reviewed Date:   3/21/2019 10:41 AM CDT      citalopram  Estimated Onset Date:   Unspecified ; Reactions:   hives ; Created By:   Farrah Quiroz RN; Reaction Status:   Active ; Category:   Drug ; Substance:   citalopram ; Type:   Allergy ; Updated By:   Farrah Quiroz RN; Reviewed Date:   3/21/2019 10:41 AM CDT        Medication List   (As Of: 5/16/2019 10:32:27 AM CDT)   Prescription/Discharge Order    raNITIdine  :   raNITIdine ; Status:   Prescribed ; Ordered As Mnemonic:   raNITIdine 300 mg oral tablet ; Simple Display Line:   300 mg, 1 tab(s), po, daily, 90 tab(s), 3 Refill(s) ; Ordering Provider:   Silvio Burns MD; Catalog Code:   raNITIdine ; Order Dt/Tm:   1/17/2019 10:18:57 AM          venlafaxine  :   venlafaxine ; Status:   Prescribed ; Ordered As Mnemonic:   venlafaxine 150 mg oral capsule, extended release ; Simple Display Line:   150 mg, 1 cap(s), po, daily, 90 cap(s), 3 Refill(s) ; Ordering Provider:   Silvio Burns MD; Catalog Code:   venlafaxine ; Order Dt/Tm:   1/17/2019 10:18:56 AM          rivaroxaban  :   rivaroxaban ;  Status:   Prescribed ; Ordered As Mnemonic:   Xarelto 20 mg oral tablet ; Simple Display Line:   20 mg, 1 tab(s), po, qpm, 90 tab(s), 3 Refill(s) ; Ordering Provider:   Silvio Burns MD; Catalog Code:   rivaroxaban ; Order Dt/Tm:   1/17/2019 10:18:55 AM          atorvastatin  :   atorvastatin ; Status:   Prescribed ; Ordered As Mnemonic:   Lipitor 20 mg oral tablet ; Simple Display Line:   20 mg, 1 tab(s), PO, Daily, 90 tab(s), 3 Refill(s) ; Ordering Provider:   Silvio Burns MD; Catalog Code:   atorvastatin ; Order Dt/Tm:   1/17/2019 10:18:54 AM          olopatadine ophthalmic  :   olopatadine ophthalmic ; Status:   Prescribed ; Ordered As Mnemonic:   olopatadine 0.1% ophthalmic solution ; Simple Display Line:   1 drop(s), both eyes, bid, PRN: for allergy symptoms, 5 mL, 11 Refill(s) ; Ordering Provider:   Silvio Burns MD; Catalog Code:   olopatadine ophthalmic ; Order Dt/Tm:   1/17/2019 10:17:58 AM          ketorolac ophthalmic  :   ketorolac ophthalmic ; Status:   Prescribed ; Ordered As Mnemonic:   ketorolac 0.5% ophthalmic solution ; Simple Display Line:   1 drop(s), Eye-Both, qid, PRN: as needed for itching, 5 mL, 11 Refill(s) ; Ordering Provider:   Silvio Burns MD; Catalog Code:   ketorolac ophthalmic ; Order Dt/Tm:   1/17/2019 10:17:57 AM          cyclobenzaprine 10 mg oral tablet  :   cyclobenzaprine 10 mg oral tablet ; Status:   Prescribed ; Ordered As Mnemonic:   cyclobenzaprine 10 mg oral tablet ; Simple Display Line:   See Instructions, TAKE ONE TABLET BY MOUTH THREE TIMES A DAY AS NEEDED FOR PAIN, 30 tab(s), 1 Refill(s) ; Ordering Provider:   Silvio Burns MD; Catalog Code:   cyclobenzaprine ; Order Dt/Tm:   10/23/2018 12:13:03 PM          traMADol  :   traMADol ; Status:   Prescribed ; Ordered As Mnemonic:   traMADol 50 mg oral tablet ; Simple Display Line:   50 mg, 1 tab(s), PO, q4hr, PRN: for pain, 60 tab(s), 1 Refill(s) ; Ordering Provider:   Silvio Burns MD;  Catalog Code:   traMADol ; Order Dt/Tm:   6/27/2018 4:21:11 PM            Home Meds    clotrimazole topical  :   clotrimazole topical ; Status:   Documented ; Ordered As Mnemonic:   clotrimazole 1% topical cream ; Simple Display Line:   Topical, bid, to replace Econazole 1% cream which is not covered by insurance., 0 Refill(s) ; Catalog Code:   clotrimazole topical ; Order Dt/Tm:   7/11/2018 3:59:56 PM          fluticasone nasal  :   fluticasone nasal ; Status:   Documented ; Ordered As Mnemonic:   Flonase 50 mcg/inh nasal spray ; Simple Display Line:   2 spray(s), nasal, daily, 0 Refill(s) ; Catalog Code:   fluticasone nasal ; Order Dt/Tm:   1/6/2016 8:47:18 AM          loratadine  :   loratadine ; Status:   Documented ; Ordered As Mnemonic:   Claritin 10 mg oral tablet ; Simple Display Line:   10 mg, 1 tab(s), po, daily ; Catalog Code:   loratadine ; Order Dt/Tm:   12/26/2013 9:46:18 AM          multivitamin  :   multivitamin ; Status:   Documented ; Ordered As Mnemonic:   Multiple Vitamins oral tablet ; Simple Display Line:   1 tab(s), PO, Daily ; Catalog Code:   multivitamin ; Order Dt/Tm:   7/20/2010 11:42:36 AM          acetaminophen  :   acetaminophen ; Status:   Documented ; Ordered As Mnemonic:   Tylenol ; Simple Display Line:   PO ; Catalog Code:   acetaminophen ; Order Dt/Tm:   7/20/2010 11:41:26 AM

## 2022-02-16 NOTE — NURSING NOTE
Comprehensive Intake Entered On:  1/17/2019 9:48 AM CST    Performed On:  1/17/2019 9:47 AM CST by Charlotte Villalba               Summary   Chief Complaint :   Px   Weight Measured :   246.0 lb(Converted to: 246 lb 0 oz, 111.58 kg)    Height Measured :   73 in(Converted to: 6 ft 1 in, 185.42 cm)    Body Mass Index :   32.45 kg/m2 (HI)    Body Surface Area :   2.39 m2   Systolic Blood Pressure :   109 mmHg   Diastolic Blood Pressure :   71 mmHg   Mean Arterial Pressure :   84 mmHg   Peripheral Pulse Rate :   67 bpm   BP Method :   Electronic   HR Method :   Electronic   Temperature Tympanic :   97.4 DegF(Converted to: 36.3 DegC)  (LOW)    Charlotte Villalba - 1/17/2019 9:47 AM CST   Health Status   Allergies Verified? :   Yes   Medication History Verified? :   Yes   Pre-Visit Planning Status :   Completed   Tobacco Use? :   Never smoker   Charlotte Villalba - 1/17/2019 9:47 AM CST

## 2022-02-16 NOTE — PROGRESS NOTES
Patient:   VIRGIL MATHEWS            MRN: [id number]            FIN: [id number]               Age:   49 years     Sex:  Male     :  1970   Associated Diagnoses:   Changing skin lesion   Author:   Silvio Burns MD      Visit Information      Date of Service: 2019 10:30 am  Performing Location: [facility]  Encounter#: [id number]      Primary Care Provider (PCP):  Silvio Burns MD    NPI# [id number]      Referring Provider:  Silvio Burns MD# [id number]      Chief Complaint   2019 10:31 AM CST  f/up skin lesion        History of Present Illness   chief complaint and symptoms as noted above confirmed with patient   lesion right shoulder treated 3 mo ago coming back      Review of Systems   Constitutional:  Negative except as documented in history of present illness.    Integumentary:  Negative except as documented in history of present illness.       Health Status   Allergies:    Allergic Reactions (Selected)  Severity Not Documented  Cats (No reactions were documented)  Citalopram (Hives)   Medications:  (Selected)   Prescriptions  Prescribed  Lipitor 20 mg oral tablet: = 1 tab(s) ( 20 mg ), PO, Daily, # 90 tab(s), 3 Refill(s), Type: Maintenance, Pharmacy: [facility], 1 tab(s) Oral daily  Pepcid 40 mg oral tablet: = 1 tab(s) ( 40 mg ), Oral, daily, # 90 tab(s), 3 Refill(s), Type: Maintenance, Pharmacy: [facility], to replace ranitidine, 1 tab(s) Oral daily,x90 day(s)  Xarelto 20 mg oral tablet: = 1 tab(s) ( 20 mg ), po, qpm, # 90 tab(s), 3 Refill(s), Type: Maintenance, Pharmacy: [facility], 1 tab(s) Oral qpm  cyclobenzaprine 10 mg oral tablet: See Instructions, Instructions: TAKE ONE TABLET BY MOUTH THREE TIMES A DAY AS NEEDED FOR PAIN, # 30 tab(s), 1 Refill(s), Type: Soft Stop, Pharmacy: [facility]  ketorolac 0.5% ophthalmic solution: 1 drop(s), Eye-Both, qid, PRN: as needed for itching, # 5 mL, 11 Refill(s), Type: Soft Stop, Pharmacy:  [facility], 1 drop(s) Eye-Both qid,PRN:as needed for itching  olopatadine 0.1% ophthalmic solution: 1 drop(s), both eyes, bid, PRN: for allergy symptoms, # 5 mL, 11 Refill(s), Type: Maintenance, Pharmacy: [facility], 1 drop(s) Eye-Both bid,PRN:for allergy symptoms  traMADol 50 mg oral tablet: 1 tab(s) ( 50 mg ), PO, q4hr, PRN: for pain, # 60 tab(s), 1 Refill(s), Type: Maintenance, called to pharmacy (Rx)  venlafaxine 150 mg oral capsule, extended release: = 1 cap(s) ( 150 mg ), po, daily, # 90 cap(s), 3 Refill(s), Type: Maintenance, Pharmacy: [facility], 1 cap(s) Oral daily  Documented Medications  Documented  Claritin 10 mg oral tablet: 1 tab(s) ( 10 mg ), po, daily, 0 Refill(s), Type: Maintenance  Flonase 50 mcg/inh nasal spray: 2 spray(s), nasal, daily, 0 Refill(s), Type: Maintenance  Multiple Vitamins oral tablet: 1 tab(s), PO, Daily, 0 Refill(s)  Tylenol: PO, 0 Refill(s)  clotrimazole 1% topical cream: Topical, bid, Instructions: to replace Econazole 1% cream which is not covered by insurance., 0 Refill(s), Type: Maintenance  predniSONE 1 mg oral tablet: = 3 tab(s) ( 3 mg ), Oral, daily, Instructions: (tapering by [facility]), 0 Refill(s), Type: Maintenance   Problem list:    All Problems  Carpal tunnel syndrome, left / SNOMED CT 92406155 / Confirmed  Back pain, chronic / SNOMED CT 716670528 / Confirmed  Dysthymia / SNOMED CT 567031240 / Confirmed  GERD (gastroesophageal reflux disease) / SNOMED CT 408650845 / Confirmed  Hx pulmonary embolism / SNOMED CT 656573548 / Confirmed  Low HDL (under 40) / SNOMED CT 674459113 / Confirmed  Monoclonal gammopathy / SNOMED CT 825397342 / Confirmed  Obese / SNOMED CT 1303258454 / Probable  Seasonal allergies / SNOMED CT 674794880 / Confirmed  Snoring disorder / ICD-9-.09 / Confirmed  Inactive: Tinea pedis / SNOMED CT 56532326  Resolved: *Hospitalized@[facility] - Bilateral pulmonary emboli  Resolved: Folliculitis / SNOMED CT 07425784  Resolved: Irregular  heartbeat / SNOMED CT 663111527  Resolved: Multiple pulmonary emboli / SNOMED CT 67280289  Resolved: Bilateral pulmonary embolism / SNOMED CT 48525366  Canceled: HTN (hypertension) / SNOMED CT 2308812139      Histories   Past Medical History:    Active  Dysthymia (867338120): Onset on 7/22/2010 at 39 years.  Comments:  7/22/2010 CDT 1:18 PM CDT -     GERD (gastroesophageal reflux disease) (812159514)  Low HDL (under 40) (209273677)  Obese (9471680819)  Seasonal allergies (856532279)  Comments:  12/19/2012 CST 9:33 AM Peace Qureshi  Stable.  Back pain, chronic (103369127)  Comments:  12/19/2012 CST 9:54 AM Peace Qureshi  An MRI has been done previously, maybe in 2005.  Snoring disorder (786.09)  Comments:  12/19/2012 CST 9:40 AM Peace Qureshi  ENT evaluation by Dr. Jaleel Douglass on 05/07/2008.  A sleep study was performed; there was no evidence of apnea.    12/19/2012 CST 9:44 AM Peace Qureshi  This began after a severe pharyngitis.  Carpal tunnel syndrome, left (79059349)  Comments:  12/19/2012 CST 9:49 AM Peace Qureshi  Marcaine and Celestone injection done on 08/17/2007.  Resolved  *[email or web address]@[facility] - Bilateral pulmonary emboli: Onset on 5/15/2014 at 43 years.  Resolved on 5/16/2014 at 43 years.  Folliculitis (43971099): Onset in the month of 4/2008 at 37 years  Resolved.  Comments:  12/19/2012 CST 9:46 AM Peace Qureshi  Right thigh.  Irregular heartbeat (835301104): Onset on 4/17/2006 at 35 years.  Resolved.  Comments:  12/19/2012 CST 9:55 AM Peace Qureshi  Holter monitor placed.  Multiple pulmonary emboli (69489250):  Resolved.  Bilateral pulmonary embolism (84944936):  Resolved.   Family History:    Carpal Tunnel Syndrome  Mother  Asthma  Grandfather (P)  Daughter (Ceola)  Hypertension  Mother  Heart disease  Father  CA - Cancer  Mother  Comments:  1/12/2016 3:42 PM ANIYAH - Angeline Kong  skin  Hypercholesterolemia  Mother  Parkinson disease  Mother  HTN -  Hypertension  Father  Back  Mother  Father  Prostate cancer..  Uncle  Comments:  12/19/2012 9:23 AM Peace Qureshi  Prostate cancer in his 60s.  GERD - Gastro-esophageal reflux disease  Sister  ASHD - Atherosclerotic heart disease  Mother     Procedure history:    Screening for colon cancer (SNOMED CT 1630937230) performed by Silvio Burns MD on 3/19/2019 at 48 Years.  Comments:  3/26/2019 9:05 AM CDT - Ramonita MAHAN, Shanika Crocker result:  negative  Recommendation:  f/u 3yrs  Cardiac computed tomography for calcium scoring (SNOMED CT 5990737512) on 1/24/2017 at 46 Years.  Comments:  2/2/2017 12:14 PM Adriane Ayala  Total Agatston calcium score is 18  Vasectomy (SNOMED CT 45606889) on 2/20/2009 at 38 Years.  Polysomnogram (SNOMED CT 881237533) on 4/21/2008 at 37 Years.  Comments:  12/19/2012 10:00 AM Peace Qureshi  Normal sleep architecture.  No evidence of obstructive sleep apnea.  Wrist injection (SNOMED CT 245118124) on 8/17/2007 at 36 Years.  Comments:  12/19/2012 9:50 AM Peace Qureshi  Marcaine and Celestone injection, left wrist, for carpal tunnel symptoms.  Holter monitor (SNOMED CT 131389666) performed by Britton Pepe MD on 4/20/2006 at 35 Years.  Comments:  12/19/2012 10:03 AM Peace Qureshi  Single 7-beat episode of asymptomatic slow atrial tachycardia with a rate of 102.  No other significant dysrhythmia.  Single episode of patient symptoms without dysrhythmia.  Cellulitis (SNOMED CT 2527940866) in 2005 at 35 Years.  Stress test ECG - treadmill (SNOMED CT 708823225) on 6/7/2005 at 34 Years.  Comments:  12/19/2012 10:06 AM Peace Qureshi  Performed at [facility] for non-exertional chest discomfort.  Adenoidectomy (SNOMED CT 562104521).  Tonsillectomy (SNOMED CT 058654779).  Lymph node removal from axilla.   Social History:        Alcohol Assessment            Current, 2-4 TIMES PER WEEK, 1 drinks/episode average.      Tobacco Assessment            Never       Substance Abuse Assessment            Never      Employment and Education Assessment            Employed, Work/School description: .      Home and Environment Assessment            Marital status: .  Spouse/Partner name: Mera Caballero.  3 children.  Risks in environment: owns               secured gun.      Nutrition and Health Assessment            Type of diet: Regular.      Exercise and Physical Activity Assessment            Exercise frequency: 1-2 times/week.  Exercise type: Walking, elliptical.      Sexual Assessment            Sexually active: Yes.  Sexual orientation: Straight or heterosexual.  Contraceptive Use Details: vasectomy,               wife had hysterectomy.        Physical Examination   Vital Signs   12/17/2019 10:31 AM CST Temperature Tympanic 97.3 DegF  LOW    Peripheral Pulse Rate 72 bpm    HR Method Electronic    Systolic Blood Pressure 114 mmHg    Diastolic Blood Pressure 74 mmHg    Mean Arterial Pressure 87 mmHg    BP Method Electronic      Measurements from flowsheet : Measurements   12/17/2019 10:31 AM CST Height Measured - Standard 73 in    Weight Measured - Standard 258.8 lb    BSA 2.46 m2    Body Mass Index 34.14 kg/m2  HI      General:  Alert and oriented, No acute distress.    Integumentary:  0.2 cm slight red lesionon right post shoulder  frozen x3 with liquid nitrogen.    Neurologic:  Alert, Oriented.       Impression and Plan   Diagnosis     Changing skin lesion ([id number] L98.9).     Course:  Progressing as expected.    Plan:  Wound therapy, likely BCC  fu 1 month if not gone.    Patient Instructions:       Counseled: Patient, Regarding diagnosis, Activity.

## 2022-02-16 NOTE — NURSING NOTE
Comprehensive Intake Entered On:  12/24/2019 9:41 AM CST    Performed On:  12/24/2019 9:37 AM CST by Adalgisa Amaral CMA               Summary   Chief Complaint :   c/o right eye lid swelling. started yesterday   Weight Measured :   264 lb(Converted to: 264 lb 0 oz, 119.75 kg)    Height Measured :   73 in(Converted to: 6 ft 1 in, 185.42 cm)    Body Mass Index :   34.83 kg/m2 (HI)    Body Surface Area :   2.48 m2   Systolic Blood Pressure :   126 mmHg   Diastolic Blood Pressure :   68 mmHg   Mean Arterial Pressure :   87 mmHg   Peripheral Pulse Rate :   74 bpm   Temperature Tympanic :   97.5 DegF(Converted to: 36.4 DegC)  (LOW)    Adalgisa Amaral CMA - 12/24/2019 9:37 AM CST   Health Status   Allergies Verified? :   Yes   Medication History Verified? :   Yes   Pre-Visit Planning Status :   Completed   Tobacco Use? :   Never smoker   Adalgisa Amaral CMA - 12/24/2019 9:37 AM CST   Consents   Consent for Immunization Exchange :   Consent Granted   Consent for Immunizations to Providers :   Consent Granted   Adalgisa Amaral CMA - 12/24/2019 9:37 AM CST   Meds / Allergies   (As Of: 12/24/2019 9:41:12 AM CST)   Allergies (Active)   Cats  Estimated Onset Date:   Unspecified ; Created By:   Peace Ibarra; Reaction Status:   Active ; Category:   Drug ; Substance:   Cats ; Type:   Allergy ; Updated By:   Peace Ibarra; Reviewed Date:   9/24/2019 10:08 AM CDT      citalopram  Estimated Onset Date:   Unspecified ; Reactions:   hives ; Created By:   Farrah Frankel RN; Reaction Status:   Active ; Category:   Drug ; Substance:   citalopram ; Type:   Allergy ; Updated By:   Farrah Frankel RN; Reviewed Date:   9/24/2019 10:08 AM CDT        Medication List   (As Of: 12/24/2019 9:41:12 AM CST)   Prescription/Discharge Order    atorvastatin  :   atorvastatin ; Status:   Prescribed ; Ordered As Mnemonic:   Lipitor 20 mg oral tablet ; Simple Display Line:   20 mg, 1 tab(s), PO, Daily, 90 tab(s), 3 Refill(s) ; Ordering  Provider:   Sivlio Burns MD; Catalog Code:   atorvastatin ; Order Dt/Tm:   1/17/2019 10:18:54 AM CST          cyclobenzaprine 10 mg oral tablet  :   cyclobenzaprine 10 mg oral tablet ; Status:   Prescribed ; Ordered As Mnemonic:   cyclobenzaprine 10 mg oral tablet ; Simple Display Line:   See Instructions, TAKE ONE TABLET BY MOUTH THREE TIMES A DAY AS NEEDED FOR PAIN, 30 tab(s), 1 Refill(s) ; Ordering Provider:   Silvio Burns MD; Catalog Code:   cyclobenzaprine ; Order Dt/Tm:   10/23/2018 12:13:03 PM CDT          famotidine  :   famotidine ; Status:   Prescribed ; Ordered As Mnemonic:   Pepcid 40 mg oral tablet ; Simple Display Line:   40 mg, 1 tab(s), Oral, daily, for 90 day(s), 90 tab(s), 3 Refill(s) ; Ordering Provider:   Silvio Burns MD; Catalog Code:   famotidine ; Order Dt/Tm:   12/11/2019 11:12:41 AM CST          ketorolac ophthalmic  :   ketorolac ophthalmic ; Status:   Prescribed ; Ordered As Mnemonic:   ketorolac 0.5% ophthalmic solution ; Simple Display Line:   1 drop(s), Eye-Both, qid, PRN: as needed for itching, 5 mL, 11 Refill(s) ; Ordering Provider:   Silvio Burns MD; Catalog Code:   ketorolac ophthalmic ; Order Dt/Tm:   1/17/2019 10:17:57 AM CST          olopatadine ophthalmic  :   olopatadine ophthalmic ; Status:   Prescribed ; Ordered As Mnemonic:   olopatadine 0.1% ophthalmic solution ; Simple Display Line:   1 drop(s), both eyes, bid, PRN: for allergy symptoms, 5 mL, 11 Refill(s) ; Ordering Provider:   Silvio Burns MD; Catalog Code:   olopatadine ophthalmic ; Order Dt/Tm:   1/17/2019 10:17:58 AM CST          rivaroxaban  :   rivaroxaban ; Status:   Prescribed ; Ordered As Mnemonic:   Xarelto 20 mg oral tablet ; Simple Display Line:   20 mg, 1 tab(s), po, qpm, 90 tab(s), 3 Refill(s) ; Ordering Provider:   Silvio Burns MD; Catalog Code:   rivaroxaban ; Order Dt/Tm:   1/17/2019 10:18:55 AM CST          traMADol  :   traMADol ; Status:   Prescribed ; Ordered As  Mnemonic:   traMADol 50 mg oral tablet ; Simple Display Line:   50 mg, 1 tab(s), PO, q4hr, PRN: for pain, 60 tab(s), 1 Refill(s) ; Ordering Provider:   Silvio Burns MD; Catalog Code:   traMADol ; Order Dt/Tm:   6/27/2018 4:21:11 PM CDT          venlafaxine  :   venlafaxine ; Status:   Prescribed ; Ordered As Mnemonic:   venlafaxine 150 mg oral capsule, extended release ; Simple Display Line:   150 mg, 1 cap(s), po, daily, 90 cap(s), 3 Refill(s) ; Ordering Provider:   Silvio Burns MD; Catalog Code:   venlafaxine ; Order Dt/Tm:   1/17/2019 10:18:56 AM CST            Home Meds    acetaminophen  :   acetaminophen ; Status:   Documented ; Ordered As Mnemonic:   Tylenol ; Simple Display Line:   PO ; Catalog Code:   acetaminophen ; Order Dt/Tm:   7/20/2010 11:41:26 AM CDT          clotrimazole topical  :   clotrimazole topical ; Status:   Documented ; Ordered As Mnemonic:   clotrimazole 1% topical cream ; Simple Display Line:   Topical, bid, to replace Econazole 1% cream which is not covered by insurance., 0 Refill(s) ; Catalog Code:   clotrimazole topical ; Order Dt/Tm:   7/11/2018 3:59:56 PM CDT          fluticasone nasal  :   fluticasone nasal ; Status:   Documented ; Ordered As Mnemonic:   Flonase 50 mcg/inh nasal spray ; Simple Display Line:   2 spray(s), nasal, daily, 0 Refill(s) ; Catalog Code:   fluticasone nasal ; Order Dt/Tm:   1/6/2016 8:47:18 AM CST          loratadine  :   loratadine ; Status:   Documented ; Ordered As Mnemonic:   Claritin 10 mg oral tablet ; Simple Display Line:   10 mg, 1 tab(s), po, daily ; Catalog Code:   loratadine ; Order Dt/Tm:   12/26/2013 9:46:18 AM CST          multivitamin  :   multivitamin ; Status:   Documented ; Ordered As Mnemonic:   Multiple Vitamins oral tablet ; Simple Display Line:   1 tab(s), PO, Daily ; Catalog Code:   multivitamin ; Order Dt/Tm:   7/20/2010 11:42:36 AM CDT          predniSONE  :   predniSONE ; Status:   Documented ; Ordered As Mnemonic:    predniSONE 1 mg oral tablet ; Simple Display Line:   3 mg, 3 tab(s), Oral, daily, (tapering by [facility]), 0 Refill(s) ; Catalog Code:   predniSONE ; Order Dt/Tm:   9/24/2019 10:19:29 AM CDT

## 2022-02-16 NOTE — NURSING NOTE
Comprehensive Intake Entered On:  6/16/2020 9:19 AM CDT    Performed On:  6/16/2020 9:16 AM CDT by Macrina Padilla CMA               Summary   Chief Complaint :   Pt here for right shoulder pain   Weight Measured :   266 lb(Converted to: 266 lb 0 oz, 120.66 kg)    Height Measured :   73 in(Converted to: 6 ft 1 in, 185.42 cm)    Body Mass Index :   35.09 kg/m2 (HI)    Body Surface Area :   2.49 m2   Systolic Blood Pressure :   134 mmHg   Diastolic Blood Pressure :   86 mmHg   Mean Arterial Pressure :   102 mmHg   Peripheral Pulse Rate :   70 bpm   Temperature Tympanic :   97.6 DegF(Converted to: 36.4 DegC)  (LOW)    Oxygen Saturation :   96 %   Macrina Padilla CMA - 6/16/2020 9:16 AM CDT   Health Status   Allergies Verified? :   Yes   Medication History Verified? :   Yes   Medical History Verified? :   Yes   Tobacco Use? :   Never smoker   Macrina Padilla CMA - 6/16/2020 9:16 AM CDT   Consents   Consent for Immunization Exchange :   Consent Granted   Consent for Immunizations to Providers :   Consent Granted   Macrina Padilla CMA - 6/16/2020 9:16 AM CDT   Meds / Allergies   (As Of: 6/16/2020 9:19:39 AM CDT)   Allergies (Active)   Cats  Estimated Onset Date:   Unspecified ; Created By:   Peace Ibarra; Reaction Status:   Active ; Category:   Drug ; Substance:   Cats ; Type:   Allergy ; Updated By:   Peace Ibarra; Reviewed Date:   6/16/2020 9:16 AM CDT      citalopram  Estimated Onset Date:   Unspecified ; Reactions:   hives ; Created By:   Farrah Quiroz RN; Reaction Status:   Active ; Category:   Drug ; Substance:   citalopram ; Type:   Allergy ; Updated By:   Farrah Quiroz RN; Reviewed Date:   6/16/2020 9:16 AM CDT        Medication List   (As Of: 6/16/2020 9:19:39 AM CDT)   Prescription/Discharge Order    atorvastatin  :   atorvastatin ; Status:   Prescribed ; Ordered As Mnemonic:   Lipitor 20 mg oral tablet ; Simple Display Line:   20 mg, 1 tab(s), PO, Daily, 90 tab(s), 3 Refill(s) ; Ordering Provider:    Silvio Burns MD; Catalog Code:   atorvastatin ; Order Dt/Tm:   1/21/2020 10:43:37 AM CST          clotrimazole topical  :   clotrimazole topical ; Status:   Prescribed ; Ordered As Mnemonic:   clotrimazole 1% topical cream ; Simple Display Line:   1 [place], Topical, bid, 60 gm, 11 Refill(s) ; Ordering Provider:   Silvio Burns MD; Catalog Code:   clotrimazole topical ; Order Dt/Tm:   1/21/2020 10:38:54 AM CST          cyclobenzaprine  :   cyclobenzaprine ; Status:   Prescribed ; Ordered As Mnemonic:   cyclobenzaprine 10 mg oral tablet ; Simple Display Line:   10 mg, 1 tab(s), Oral, tid, PRN: for pain, 30 tab(s), 1 Refill(s) ; Ordering Provider:   Silvio Burns MD; Catalog Code:   cyclobenzaprine ; Order Dt/Tm:   1/21/2020 10:40:00 AM CST          famotidine  :   famotidine ; Status:   Prescribed ; Ordered As Mnemonic:   Pepcid 40 mg oral tablet ; Simple Display Line:   40 mg, 1 tab(s), Oral, daily, 90 tab(s), 3 Refill(s) ; Ordering Provider:   Silvio Burns MD; Catalog Code:   famotidine ; Order Dt/Tm:   1/21/2020 10:43:37 AM CST          ketorolac ophthalmic  :   ketorolac ophthalmic ; Status:   Prescribed ; Ordered As Mnemonic:   ketorolac 0.5% ophthalmic solution ; Simple Display Line:   1 drop(s), Eye-Both, qid, PRN: as needed for itching, 5 mL, 11 Refill(s) ; Ordering Provider:   Silvio Burns MD; Catalog Code:   ketorolac ophthalmic ; Order Dt/Tm:   1/21/2020 10:39:36 AM CST          olopatadine ophthalmic  :   olopatadine ophthalmic ; Status:   Prescribed ; Ordered As Mnemonic:   olopatadine 0.1% ophthalmic solution ; Simple Display Line:   1 drop(s), both eyes, bid, PRN: for allergy symptoms, 5 mL, 11 Refill(s) ; Ordering Provider:   Silvio Burns MD; Catalog Code:   olopatadine ophthalmic ; Order Dt/Tm:   1/21/2020 10:39:35 AM CST          rivaroxaban  :   rivaroxaban ; Status:   Prescribed ; Ordered As Mnemonic:   Xarelto 20 mg oral tablet ; Simple Display Line:   20 mg, 1  tab(s), po, qpm, 90 tab(s), 3 Refill(s) ; Ordering Provider:   Silvio Burns MD; Catalog Code:   rivaroxaban ; Order Dt/Tm:   1/21/2020 10:43:35 AM CST          traMADol  :   traMADol ; Status:   Prescribed ; Ordered As Mnemonic:   traMADol 50 mg oral tablet ; Simple Display Line:   50 mg, 1 tab(s), PO, q4hr, PRN: for pain, 60 tab(s), 1 Refill(s) ; Ordering Provider:   Silvio Burns MD; Catalog Code:   traMADol ; Order Dt/Tm:   6/27/2018 4:21:11 PM CDT          venlafaxine  :   venlafaxine ; Status:   Prescribed ; Ordered As Mnemonic:   venlafaxine 150 mg oral capsule, extended release ; Simple Display Line:   150 mg, 1 cap(s), po, daily, 90 cap(s), 3 Refill(s) ; Ordering Provider:   Silvio Burns MD; Catalog Code:   venlafaxine ; Order Dt/Tm:   1/21/2020 10:43:36 AM CST            Home Meds    acetaminophen  :   acetaminophen ; Status:   Documented ; Ordered As Mnemonic:   Tylenol ; Simple Display Line:   PO ; Catalog Code:   acetaminophen ; Order Dt/Tm:   7/20/2010 11:41:26 AM CDT          fluticasone nasal  :   fluticasone nasal ; Status:   Documented ; Ordered As Mnemonic:   Flonase 50 mcg/inh nasal spray ; Simple Display Line:   2 spray(s), nasal, daily, 0 Refill(s) ; Catalog Code:   fluticasone nasal ; Order Dt/Tm:   1/6/2016 8:47:18 AM CST          loratadine  :   loratadine ; Status:   Documented ; Ordered As Mnemonic:   Claritin 10 mg oral tablet ; Simple Display Line:   10 mg, 1 tab(s), po, daily ; Catalog Code:   loratadine ; Order Dt/Tm:   12/26/2013 9:46:18 AM CST          multivitamin  :   multivitamin ; Status:   Documented ; Ordered As Mnemonic:   Multiple Vitamins oral tablet ; Simple Display Line:   1 tab(s), PO, Daily ; Catalog Code:   multivitamin ; Order Dt/Tm:   7/20/2010 11:42:36 AM CDT            ID Risk Screen   Recent Travel History :   No recent travel   Family Member Travel History :   No recent travel   Other Exposure to Infectious Disease :   Unknown   Randy MYRICK,  Macrina - 6/16/2020 9:16 AM RONIT

## 2022-02-16 NOTE — NURSING NOTE
Phone Message    PCP: MICHELLE    Time of call: 4:04pm message left    Person calling: Misael with [facility] [place]  Contact # : [phone]    MESSAGE: A fax was sent to clinic today for a refill. They received this back stating renewed 1/12/17 #90+1 - this would make it due for a refill. Wondering if patient is to continue with therapy.    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 Rx renewed x1 month. Px note states pt was to f/u in 6 months with chronic diseases.

## 2022-02-16 NOTE — NURSING NOTE
Comprehensive Intake Entered On:  6/6/2019 8:10 AM CDT    Performed On:  6/6/2019 8:08 AM CDT by Charlotte Villalba               Summary   Chief Complaint :   Preop. 6/18/19  [facility]. Cyst on right hand.  Dr. Oliver   Weight Measured :   253.2 lb(Converted to: 253 lb 3 oz, 114.85 kg)    Systolic Blood Pressure :   114 mmHg   Diastolic Blood Pressure :   73 mmHg   Mean Arterial Pressure :   87 mmHg   Peripheral Pulse Rate :   64 bpm   BP Method :   Electronic   HR Method :   Electronic   Temperature Tympanic :   97.4 DegF(Converted to: 36.3 DegC)  (LOW)    Charlotte Villalba - 6/6/2019 8:08 AM CDT   Health Status   Allergies Verified? :   Yes   Medication History Verified? :   Yes   Pre-Visit Planning Status :   Completed   Tobacco Use? :   Never smoker   Charlotte Villalba - 6/6/2019 8:08 AM CDT   Meds / Allergies   (As Of: 6/6/2019 8:10:52 AM CDT)   Allergies (Active)   Cats  Estimated Onset Date:   Unspecified ; Created By:   Peace Ibarra; Reaction Status:   Active ; Category:   Drug ; Substance:   Cats ; Type:   Allergy ; Updated By:   Peace Ibarra; Reviewed Date:   3/21/2019 10:41 AM CDT      citalopram  Estimated Onset Date:   Unspecified ; Reactions:   hives ; Created By:   Farrah Quiroz RN; Reaction Status:   Active ; Category:   Drug ; Substance:   citalopram ; Type:   Allergy ; Updated By:   Farrah Quiroz RN; Reviewed Date:   3/21/2019 10:41 AM CDT        Medication List   (As Of: 6/6/2019 8:10:52 AM CDT)   Prescription/Discharge Order    raNITIdine  :   raNITIdine ; Status:   Prescribed ; Ordered As Mnemonic:   raNITIdine 300 mg oral tablet ; Simple Display Line:   300 mg, 1 tab(s), po, daily, 90 tab(s), 3 Refill(s) ; Ordering Provider:   Silvio Burns MD; Catalog Code:   raNITIdine ; Order Dt/Tm:   1/17/2019 10:18:57 AM          venlafaxine  :   venlafaxine ; Status:   Prescribed ; Ordered As Mnemonic:   venlafaxine 150 mg oral capsule, extended release ; Simple Display Line:    150 mg, 1 cap(s), po, daily, 90 cap(s), 3 Refill(s) ; Ordering Provider:   Silvio Burns MD; Catalog Code:   venlafaxine ; Order Dt/Tm:   1/17/2019 10:18:56 AM          rivaroxaban  :   rivaroxaban ; Status:   Prescribed ; Ordered As Mnemonic:   Xarelto 20 mg oral tablet ; Simple Display Line:   20 mg, 1 tab(s), po, qpm, 90 tab(s), 3 Refill(s) ; Ordering Provider:   Silvio Burns MD; Catalog Code:   rivaroxaban ; Order Dt/Tm:   1/17/2019 10:18:55 AM          atorvastatin  :   atorvastatin ; Status:   Prescribed ; Ordered As Mnemonic:   Lipitor 20 mg oral tablet ; Simple Display Line:   20 mg, 1 tab(s), PO, Daily, 90 tab(s), 3 Refill(s) ; Ordering Provider:   Silvio Burns MD; Catalog Code:   atorvastatin ; Order Dt/Tm:   1/17/2019 10:18:54 AM          olopatadine ophthalmic  :   olopatadine ophthalmic ; Status:   Prescribed ; Ordered As Mnemonic:   olopatadine 0.1% ophthalmic solution ; Simple Display Line:   1 drop(s), both eyes, bid, PRN: for allergy symptoms, 5 mL, 11 Refill(s) ; Ordering Provider:   Silvio Burns MD; Catalog Code:   olopatadine ophthalmic ; Order Dt/Tm:   1/17/2019 10:17:58 AM          ketorolac ophthalmic  :   ketorolac ophthalmic ; Status:   Prescribed ; Ordered As Mnemonic:   ketorolac 0.5% ophthalmic solution ; Simple Display Line:   1 drop(s), Eye-Both, qid, PRN: as needed for itching, 5 mL, 11 Refill(s) ; Ordering Provider:   Silvio Burns MD; Catalog Code:   ketorolac ophthalmic ; Order Dt/Tm:   1/17/2019 10:17:57 AM          cyclobenzaprine 10 mg oral tablet  :   cyclobenzaprine 10 mg oral tablet ; Status:   Prescribed ; Ordered As Mnemonic:   cyclobenzaprine 10 mg oral tablet ; Simple Display Line:   See Instructions, TAKE ONE TABLET BY MOUTH THREE TIMES A DAY AS NEEDED FOR PAIN, 30 tab(s), 1 Refill(s) ; Ordering Provider:   Silvio Burns MD; Catalog Code:   cyclobenzaprine ; Order Dt/Tm:   10/23/2018 12:13:03 PM          traMADol  :   traMADol ; Status:    Prescribed ; Ordered As Mnemonic:   traMADol 50 mg oral tablet ; Simple Display Line:   50 mg, 1 tab(s), PO, q4hr, PRN: for pain, 60 tab(s), 1 Refill(s) ; Ordering Provider:   Silvio Burns MD; Catalog Code:   traMADol ; Order Dt/Tm:   6/27/2018 4:21:11 PM            Home Meds    clotrimazole topical  :   clotrimazole topical ; Status:   Documented ; Ordered As Mnemonic:   clotrimazole 1% topical cream ; Simple Display Line:   Topical, bid, to replace Econazole 1% cream which is not covered by insurance., 0 Refill(s) ; Catalog Code:   clotrimazole topical ; Order Dt/Tm:   7/11/2018 3:59:56 PM          fluticasone nasal  :   fluticasone nasal ; Status:   Documented ; Ordered As Mnemonic:   Flonase 50 mcg/inh nasal spray ; Simple Display Line:   2 spray(s), nasal, daily, 0 Refill(s) ; Catalog Code:   fluticasone nasal ; Order Dt/Tm:   1/6/2016 8:47:18 AM          loratadine  :   loratadine ; Status:   Documented ; Ordered As Mnemonic:   Claritin 10 mg oral tablet ; Simple Display Line:   10 mg, 1 tab(s), po, daily ; Catalog Code:   loratadine ; Order Dt/Tm:   12/26/2013 9:46:18 AM          multivitamin  :   multivitamin ; Status:   Documented ; Ordered As Mnemonic:   Multiple Vitamins oral tablet ; Simple Display Line:   1 tab(s), PO, Daily ; Catalog Code:   multivitamin ; Order Dt/Tm:   7/20/2010 11:42:36 AM          acetaminophen  :   acetaminophen ; Status:   Documented ; Ordered As Mnemonic:   Tylenol ; Simple Display Line:   PO ; Catalog Code:   acetaminophen ; Order Dt/Tm:   7/20/2010 11:41:26 AM

## 2022-02-16 NOTE — PROGRESS NOTES
Patient:   VIRGIL MATHEWS            MRN: [id number]            FIN: [id number]               Age:   47 years     Sex:  Male     :  1970   Associated Diagnoses:   Back pain, chronic; Dysthymia; HTN (Hypertension); Multiple pulmonary emboli; Seasonal Allergies   Author:   Silvio Burns MD      Visit Information      Date of Service: 07/10/2018 12:41 pm  Performing Location: [facility]  Encounter#: [id number]      Primary Care Provider (PCP):  Silvio Burns MD# [id number]      Referring Provider:  Silvio Burns MD# [id number]      Chief Complaint   7/10/2018 12:46 PM CDT   HTN check up.        History of Present Illness                    The patient presents for follow-up evaluation of hypertension.  The context of the hypertension: blood pressure was maintained within the target range.  Relieving factors consist of medication.  Prior treatment consists of lifestyle modification weight reduction.        Review of Systems   Constitutional:  Negative except as documented in history of present illness.    Ear/Nose/Mouth/Throat:  Negative.    Respiratory:  Negative.    Cardiovascular:  Negative.    Gastrointestinal:  Negative.    Genitourinary:  Negative.    Musculoskeletal:  Negative except as documented in history of present illness.    Integumentary:  Negative except as documented in history of present illness.    Neurologic:  Negative.       Health Status   Allergies:    Allergic Reactions (Selected)  Severity Not Documented  Cats (No reactions were documented)  Citalopram (Hives)   Medications:  (Selected)   Prescriptions  Prescribed  Lipitor 20 mg oral tablet: 1 tab(s) ( 20 mg ), PO, Daily, # 90 tab(s), 1 Refill(s), Type: Maintenance, Pharmacy: [facility], 1 tab(s) Oral daily  Xarelto 20 mg oral tablet: 1 tab(s) ( 20 mg ), po, qpm, # 90 tab(s), 1 Refill(s), Type: Maintenance, Pharmacy: [facility], 1 tab(s) Oral qpm  cyclobenzaprine 10 mg oral tablet:  See Instructions, Instructions: TAKE ONE TABLET BY MOUTH THREE TIMES A DAY AS NEEDED FOR PAIN, # 30 unknown unit, 1 Refill(s), Type: Soft Stop, Pharmacy: [facility]  econazole 1% topical cream: 1 [place], TOP, BID, # 85 g, 5 Refill(s), Type: Maintenance, Pharmacy: [facility], 1 [place] Topical bid  ketorolac 0.5% ophthalmic solution: See Instructions, Instructions: USE 1 DROP IN BOTH EYES FOUR TIMES A DAY AS NEEDED FOR ITCHING, # 5 mL, 1 Refill(s), Type: Soft Stop, Pharmacy: [facility], USE 1 DROP IN BOTH EYES FOUR TIMES A DAY AS NEEDED FOR ITCHING  olopatadine 0.1% ophthalmic solution: 1 drop(s), both eyes, bid, PRN: for allergy symptoms, # 5 mL, 5 Refill(s), Type: Maintenance, Pharmacy: [facility], 1 drop(s) Eye-Both bid,PRN:for allergy symptoms  raNITIdine 300 mg oral tablet: 1 tab(s) ( 300 mg ), po, daily, # 90 tab(s), 1 Refill(s), Type: Maintenance, Pharmacy: [facility], 1 tab(s) Oral daily  traMADol 50 mg oral tablet: 1 tab(s) ( 50 mg ), PO, q4hr, PRN: for pain, # 60 tab(s), 1 Refill(s), Type: Maintenance, called to pharmacy (Rx)  venlafaxine 150 mg oral capsule, extended release: 1 cap(s) ( 150 mg ), po, daily, # 90 cap(s), 1 Refill(s), Type: Maintenance, Pharmacy: [facility], 1 cap(s) Oral daily  Documented Medications  Documented  Claritin 10 mg oral tablet: 1 tab(s) ( 10 mg ), po, daily, 0 Refill(s), Type: Maintenance  Flonase 50 mcg/inh nasal spray: 2 spray(s), nasal, daily, 0 Refill(s), Type: Maintenance  Multiple Vitamins oral tablet: 1 tab(s), PO, Daily, 0 Refill(s)  Tylenol: PO, 0 Refill(s)   Problem list:    All Problems (Selected)  Carpal tunnel syndrome, left / SNOMED CT 49714433 / Confirmed  Back pain, chronic / SNOMED CT 912065809 / Confirmed  Dysthymia / SNOMED CT 259705301 / Confirmed  GERD (gastroesophageal reflux disease) / SNOMED CT 321279946 / Confirmed  Low HDL (under 40) / SNOMED CT 583366007 / Confirmed  Monoclonal gammopathy / SNOMED CT 856483130 /  Confirmed  Obese / SNOMED CT 1039135264 / Probable  Multiple pulmonary emboli / SNOMED CT 13912211 / Confirmed  Bilateral pulmonary embolism / SNOMED CT 76921376 / Confirmed  Seasonal allergies / SNOMED CT 395766924 / Confirmed  Snoring disorder / ICD-9-.09 / Confirmed      Histories   Past Medical History:    Active  Dysthymia (150043848): Onset on 7/22/2010 at 39 years.  Comments:  7/22/2010 CDT 1:18 PM CDT -     GERD (gastroesophageal reflux disease) (617137741)  Low HDL (under 40) (334396544)  Obese (2958113487)  Seasonal allergies (342012793)  Comments:  12/19/2012 CST 9:33 AM Peace Qureshi  Stable.  Back pain, chronic (979745475)  Comments:  12/19/2012 CST 9:54 AM Peace Qureshi  An MRI has been done previously, maybe in 2005.  Snoring disorder (786.09)  Comments:  12/19/2012 CST 9:40 AM Peace Qureshi  ENT evaluation by Dr. Jaleel Douglass on 05/07/2008.  A sleep study was performed; there was no evidence of apnea.    12/19/2012 CST 9:44 AM Peace Qureshi  This began after a severe pharyngitis.  Carpal tunnel syndrome, left (30400647)  Comments:  12/19/2012 CST 9:49 AM Peace Qureshi  Marcaine and Celestone injection done on 08/17/2007.  Resolved  *Hospitalized@[facility] - Bilateral pulmonary emboli: Onset on 5/15/2014 at 43 years.  Resolved on 5/16/2014 at 43 years.  Folliculitis (36521016): Onset in the month of 4/2008 at 37 years  Resolved.  Comments:  12/19/2012 CST 9:46 AM Peace Qureshi  Right thigh.  Irregular heartbeat (847537568): Onset on 4/17/2006 at 35 years.  Resolved.  Comments:  12/19/2012 CST 9:55 AM Peace Qureshi  Holter monitor placed.   Family History:    Carpal Tunnel Syndrome  Mother  Asthma  Grandfather (P)  Daughter ([place]ola)  Hypertension  Mother  Heart disease  Father  CA - Cancer  Mother  Comments:  1/12/2016 3:42 PM - [place] , [place]  skin  Hypercholesterolemia  Mother  Parkinson disease  Mother  HTN - Hypertension  Father  Back  Mother  Father  Prostate  cancer..  Uncle  Comments:  12/19/2012 9:23 AM - Peace Ibarra  Prostate cancer in his 60s.  GERD - Gastro-esophageal reflux disease  Sister  ASHD - Atherosclerotic heart disease  Mother     Procedure history:    Cardiac computed tomography for calcium scoring (SNOMED CT 7259611940) on 1/24/2017 at 46 Years.  Comments:  2/2/2017 12:14 PM - Adriane Hayden  Total Agatston calcium score is 18  Vasectomy (SNOMED CT 63433205) on 2/20/2009 at 38 Years.  Polysomnogram (SNOMED CT 034171120) on 4/21/2008 at 37 Years.  Comments:  12/19/2012 10:00 AM - Peace Ibarra  Normal sleep architecture.  No evidence of obstructive sleep apnea.  Wrist injection (SNOMED CT 703808695) on 8/17/2007 at 36 Years.  Comments:  12/19/2012 9:50 AM - Peace Ibarra  Marcaine and Celestone injection, left wrist, for carpal tunnel symptoms.  Holter monitor (SNOMED CT 789331319) performed by Britton Pepe MD on 4/20/2006 at 35 Years.  Comments:  12/19/2012 10:03 AM - Peace Ibarra  Single 7-beat episode of asymptomatic slow atrial tachycardia with a rate of 102.  No other significant dysrhythmia.  Single episode of patient symptoms without dysrhythmia.  Cellulitis (SNOMED CT 0981237432) in 2005 at 35 Years.  Stress test ECG - treadmill (SNOMED CT 879029355) on 6/7/2005 at 34 Years.  Comments:  12/19/2012 10:06 AM - Peace Ibarra  Performed at [facility] for non-exertional chest discomfort.  Adenoidectomy (SNOMED CT 663160066).  Tonsillectomy (SNOMED CT 744868469).  Lymph node removal from axilla.   Social History:        Alcohol Assessment            Current, 1-2 times per week, 1 drinks/episode average.  2 drinks/episode maximum.      Tobacco Assessment            Never      Substance Abuse Assessment            Never      Employment and Education Assessment            Employed, Work/School description: .      Home and Environment Assessment            Marital status: .  Spouse/Partner name: Mera Caballero.  3 children.   Risks in environment: owns               secured gun.      Nutrition and Health Assessment            Type of diet: Regular.      Exercise and Physical Activity Assessment            Exercise frequency: 1-2 times/week.  Exercise type: Walking, elliptical.      Sexual Assessment            Sexually active: Yes.  Sexual orientation: Straight or heterosexual.  Contraceptive Use Details: vasectomy,               wife had hysterectomy.        Physical Examination   Vital Signs   7/10/2018 12:46 PM CDT Temperature Tympanic 98.0 DegF    Peripheral Pulse Rate 63 bpm    HR Method Electronic    Systolic Blood Pressure 116 mmHg    Diastolic Blood Pressure 73 mmHg    Mean Arterial Pressure 87 mmHg    BP Method Electronic      Measurements from flowsheet : Measurements   7/10/2018 12:46 PM CDT   Weight Measured - Standard                233.6 lb     General:  Alert and oriented, No acute distress.    Eye:  Pupils are equal, round and reactive to light, Extraocular movements are intact.    HENT:  Normocephalic, Tympanic membranes are clear, Normal hearing, Oral mucosa is moist.    Neck:  Supple, Non-tender, No carotid bruit, No jugular venous distention, No lymphadenopathy, No thyromegaly.    Respiratory:  Lungs are clear to auscultation, Respirations are non-labored, Breath sounds are equal.    Cardiovascular:  Normal rate, Regular rhythm, No murmur, Good pulses equal in all extremities, No edema.    Gastrointestinal:  Soft, Non-tender, Non-distended, Normal bowel sounds, No organomegaly.    Musculoskeletal:  Normal range of motion, Normal strength, No tenderness, No swelling, No deformity.    Integumentary:  Warm, Dry, No qualifying data available.   .    Neurologic:  Alert, Oriented, Normal sensory, Normal motor function, No focal deficits, Cranial Nerves II-XII are grossly intact, Normal deep tendon reflexes.    Psychiatric:  Cooperative, Appropriate mood & affect, Normal judgment.       Health Maintenance       Recommendations     Pending (in the next year)        Due            Aspirin Therapy for Prevention of CVD (Male) due  07/10/18  and every 5  year(s)        Due In Future            Lipid Disorders Screen (Male) not due until  01/09/19  and every 1  year(s)           Depression Screen (Male) not due until  01/16/19  and every 1  year(s)           Body Mass Index Check (Male) not due until  06/07/19  and every 1  year(s)     Satisfied (in the past 1 year)        Satisfied            Alcohol Misuse Screen (Male) on  01/16/18.           Body Mass Index Check (Male) on  06/07/18.           Body Mass Index Check (Male) on  01/16/18.           Depression Screen (Male) on  01/16/18.           Depression Screen (Male) on  01/16/18.           Depression Screen (Male) on  01/16/18.           High Blood Pressure Screen (Male) on  07/10/18.           High Blood Pressure Screen (Male) on  06/07/18.           High Blood Pressure Screen (Male) on  01/16/18.           High Blood Pressure Screen (Male) on  10/31/17.           Lipid Disorders Screen (Male) on  01/09/18.           Lipid Disorders Screen (Male) on  01/09/18.           Lipid Disorders Screen (Male) on  01/09/18.           Lipid Disorders Screen (Male) on  01/09/18.           Obesity Screen and Counseling (Male) on  07/10/18.           Obesity Screen and Counseling (Male) on  06/07/18.           Obesity Screen and Counseling (Male) on  01/16/18.           Obesity Screen and Counseling (Male) on  10/31/17.           Tobacco Use Screen (Male) on  07/10/18.           Tobacco Use Screen (Male) on  06/07/18.           Tobacco Use Screen (Male) on  01/16/18.          Impression and Plan   Diagnosis     Back pain, chronic (PPN67-FD M54.9).     Dysthymia (IAI56-CV F34.1).     HTN (Hypertension) (XEU05-BD I10).     Multiple pulmonary emboli (XRT21-JX I26.99).     Seasonal Allergies (IRO34-FS J30.2).     Course:  Progressing as expected.    Plan:  doing well 6 mo.    Patient  Instructions:       Counseled: Patient, Regarding diagnosis, Regarding treatment, Regarding medications, Diet, Activity.

## 2022-02-16 NOTE — TELEPHONE ENCOUNTER
---------------------  From: Vidya Brown CMA (eRx Pool ([id number]))   To: Silvio Burns MD;     Sent: 7/23/2019 1:15:29 PM CDT  Subject: FW: Medication Management   Due Date/Time: 7/24/2019 9:30:00 AM CDT           Entered by Vidya Brown CMA on July 23, 2019 1:15:16 PM CDT  PCP:   MICHELLE    Medication:   Tramadol  Last Filled:  6/27/18   Quantity:  60 Refills:  1    Date of last office visit and reason:   6/6/19 pre-op  Date of last labs pertaining to condition:  n/a    Note: Please advise on refill     Return to Clinic order placed?  01/2020 annual    Resource:   eRx pool  Phone:   [phone]      ** Patient matched by Vidya Brown CMA on 7/23/2019 1:12:14 PM CDT **      ------------------------------------------  From: [facility]  To: Silvio Burns MD  Sent: July 23, 2019 9:30:26 AM CDT  Subject: Medication Management  Due: July 24, 2019 9:30:26 AM CDT    ** On Hold Pending Signature **  Drug: traMADol (traMADol 50 mg oral tablet)  TAKE ONE TABLET BY MOUTH EVERY 4 HOURS AS NEEDED FOR PAIN  Quantity: 60 tab(s)     Days Supply: 10        Refills: 1  Substitutions Allowed  Notes from Pharmacy:     Dispensed Drug: traMADol (traMADol 50 mg oral tablet)  TAKE ONE TABLET BY MOUTH EVERY 4 HOURS AS NEEDED FOR PAIN  Quantity: 60 tab(s)     Days Supply: 10        Refills: 1  Substitutions Allowed  Notes from Pharmacy:   ---------------------------------------------------------------  From: Silvio Burns MD   To: eRx Pool ([id number] [facility] [place]);     Sent: 7/23/2019 2:07:45 PM CDT  Subject: RE: Medication Management     ok to refill same # and directions  no refills though---------------------  From: Vidya Brown CMA   To: [facility]    Sent: 7/23/2019 4:16:01 PM CDT  Subject: RE: Medication Management     ** Not Approved: Request responded to by other means, prescription called into pharmacy **  traMADol (TRAMADOL HCL 50MG TABS)  TAKE ONE TABLET BY MOUTH EVERY 4 HOURS AS NEEDED FOR  PAIN  Qty:  60 tab(s)        Days Supply:  10        Refills:  1          Substitutions Allowed     Route To Pharmacy - [facility]   Signed by Vidya Brown CMA

## 2022-02-16 NOTE — PROGRESS NOTES
Patient:   VIRGIL MATHEWS            MRN: [id number]            FIN: [id number]               Age:   49 years     Sex:  Male     :  1970   Associated Diagnoses:   Annual exam; Obese; Hypercholesteremia; Dysthymia; Bilateral pulmonary embolism; Monoclonal gammopathy; Changing skin lesion; Back pain, chronic   Author:   Silvio Burns MD      Visit Information      Date of Service: 2020 09:44 am  Performing Location: [facility]  Encounter#: [id number]      Primary Care Provider (PCP):  Silvio Burns MD    NPI# [id number]      Referring Provider:  Silvio Burns MD# [id number]   Visit type:  Annual exam.    Accompanied by:  No one.    Source of history:  Self.    History limitation:  None.       Chief Complaint   2020 9:49 AM CST    Px      Well Adult History   Well Adult History             The patient presents for well adult exam, fu htn and pe.  The patient's general health status is described as good.  The patient's diet is described as balanced.  Exercise: routine, aerobic activity.  Associated symptoms consist of none.  Complaint: Taking medications as directed   long term xarelto for recurrent pe   Seeing heme for  monoclonal jm  bp good no longer htn with wt loss  Right shoulder lesion not gone   .  Additional pertinent history: occasional caffeine use, tobacco use none and alcohol use socially.  Notes.            Review of Systems   Constitutional:  No fever, No chills, No sweats, No weakness, No fatigue.    Eye:  No recent visual problem.    Ear/Nose/Mouth/Throat:  No sore throat.    Respiratory:  No shortness of breath, No cough, No sputum production.    Cardiovascular:  No chest pain, No peripheral edema.    Gastrointestinal:  Negative except as documented in history of present illness, No nausea, No vomiting, No diarrhea, No constipation.    Genitourinary:  No dysuria, No hematuria.    Musculoskeletal:  Negative except as documented in history of  present illness, No back pain, No muscle pain.    Integumentary:  Negative except as documented in history of present illness, No rash.    Neurologic:  Alert and oriented X4.       Health Status   Allergies:    Allergic Reactions (Selected)  Severity Not Documented  Cats (No reactions were documented)  Citalopram (Hives)   Medications:  (Selected)   Prescriptions  Prescribed  Lipitor 20 mg oral tablet: = 1 tab(s) ( 20 mg ), PO, Daily, # 90 tab(s), 3 Refill(s), Type: Maintenance, Pharmacy: [facility], 1 tab(s) Oral daily  Pepcid 40 mg oral tablet: = 1 tab(s) ( 40 mg ), Oral, daily, # 90 tab(s), 3 Refill(s), Type: Maintenance, Pharmacy: [facility], to replace ranitidine, 1 tab(s) Oral daily,x90 day(s)  Xarelto 20 mg oral tablet: = 1 tab(s) ( 20 mg ), po, qpm, # 90 tab(s), 3 Refill(s), Type: Maintenance, Pharmacy: [facility], 1 tab(s) Oral qpm  cyclobenzaprine 10 mg oral tablet: See Instructions, Instructions: TAKE ONE TABLET BY MOUTH THREE TIMES A DAY AS NEEDED FOR PAIN, # 30 tab(s), 1 Refill(s), Type: Soft Stop, Pharmacy: [facility]  ketorolac 0.5% ophthalmic solution: 1 drop(s), Eye-Both, qid, PRN: as needed for itching, # 5 mL, 11 Refill(s), Type: Soft Stop, Pharmacy: [facility], 1 drop(s) Eye-Both qid,PRN:as needed for itching  olopatadine 0.1% ophthalmic solution: 1 drop(s), both eyes, bid, PRN: for allergy symptoms, # 5 mL, 11 Refill(s), Type: Maintenance, Pharmacy: [facility], 1 drop(s) Eye-Both bid,PRN:for allergy symptoms  traMADol 50 mg oral tablet: 1 tab(s) ( 50 mg ), PO, q4hr, PRN: for pain, # 60 tab(s), 1 Refill(s), Type: Maintenance, called to pharmacy (Rx)  venlafaxine 150 mg oral capsule, extended release: = 1 cap(s) ( 150 mg ), po, daily, # 90 cap(s), 3 Refill(s), Type: Maintenance, Pharmacy: [facility], 1 cap(s) Oral daily  Documented Medications  Documented  Claritin 10 mg oral tablet: 1 tab(s) ( 10 mg ), po, daily, 0 Refill(s), Type: Maintenance  Flonase 50  mcg/inh nasal spray: 2 spray(s), nasal, daily, 0 Refill(s), Type: Maintenance  Multiple Vitamins oral tablet: 1 tab(s), PO, Daily, 0 Refill(s)  Tylenol: PO, 0 Refill(s)  clotrimazole 1% topical cream: Topical, bid, Instructions: to replace Econazole 1% cream which is not covered by insurance., 0 Refill(s), Type: Maintenance,    Medications          *denotes recorded medication          *Tylenol: PO.          Lipitor 20 mg oral tablet: 20 mg, 1 tab(s), PO, Daily, 90 tab(s), 3 Refill(s).          *clotrimazole 1% topical cream: Topical, bid, to replace Econazole 1% cream which is not covered by insurance., 0 Refill(s).          cyclobenzaprine 10 mg oral tablet: See Instructions, TAKE ONE TABLET BY MOUTH THREE TIMES A DAY AS NEEDED FOR PAIN, 30 tab(s), 1 Refill(s).          Pepcid 40 mg oral tablet: 40 mg, 1 tab(s), Oral, daily, for 90 day(s), 90 tab(s), 3 Refill(s).          *Flonase 50 mcg/inh nasal spray: 2 spray(s), nasal, daily, 0 Refill(s).          ketorolac 0.5% ophthalmic solution: 1 drop(s), Eye-Both, qid, PRN: as needed for itching, 5 mL, 11 Refill(s).          *Claritin 10 mg oral tablet: 10 mg, 1 tab(s), po, daily.          *Multiple Vitamins oral tablet: 1 tab(s), PO, Daily.          olopatadine 0.1% ophthalmic solution: 1 drop(s), both eyes, bid, PRN: for allergy symptoms, 5 mL, 11 Refill(s).          Xarelto 20 mg oral tablet: 20 mg, 1 tab(s), po, qpm, 90 tab(s), 3 Refill(s).          traMADol 50 mg oral tablet: 50 mg, 1 tab(s), PO, q4hr, PRN: for pain, 60 tab(s), 1 Refill(s).          venlafaxine 150 mg oral capsule, extended release: 150 mg, 1 cap(s), po, daily, 90 cap(s), 3 Refill(s).       Problem list:    All Problems  Carpal tunnel syndrome, left / SNOMED CT 85009436 / Confirmed  Back pain, chronic / SNOMED CT 283214508 / Confirmed  Dysthymia / SNOMED CT 708778106 / Confirmed  GERD (gastroesophageal reflux disease) / SNOMED CT 553516337 / Confirmed  Hx pulmonary embolism / SNOMED CT 400950479 /  Confirmed  Low HDL (under 40) / SNOMED CT 330610250 / Confirmed  Monoclonal gammopathy / SNOMED CT 316903922 / Confirmed  Obese / SNOMED CT 4674793818 / Probable  Seasonal allergies / SNOMED CT 807453812 / Confirmed  Snoring disorder / ICD-9-.09 / Confirmed  Inactive: Tinea pedis / SNOMED CT 10179598  Resolved: *Hospitalized@[email or web address] - Bilateral pulmonary emboli  Resolved: Folliculitis / SNOMED CT 84173562  Resolved: Irregular heartbeat / SNOMED CT 405117701  Resolved: Multiple pulmonary emboli / SNOMED CT 88492400  Resolved: Bilateral pulmonary embolism / SNOMED CT 59536642  Canceled: HTN (hypertension) / SNOMED CT 6178616887      Histories   Past Medical History:    Active  Dysthymia (535593177): Onset on 7/22/2010 at 39 years.  Comments:  7/22/2010 CDT 1:18 PM CDT -     GERD (gastroesophageal reflux disease) (369578028)  Low HDL (under 40) (972278184)  Obese (5775088761)  Seasonal allergies (697194365)  Comments:  12/19/2012 CST 9:33 AM Peace Qureshi  Stable.  Back pain, chronic (661151647)  Comments:  12/19/2012 CST 9:54 AM Peace Qureshi  An MRI has been done previously, maybe in 2005.  Snoring disorder (786.09)  Comments:  12/19/2012 CST 9:40 AM Peace Qureshi  ENT evaluation by Dr. Jaleel Douglass on 05/07/2008.  A sleep study was performed; there was no evidence of apnea.    12/19/2012 CST 9:44 AM Peace Qureshi  This began after a severe pharyngitis.  Carpal tunnel syndrome, left (81784724)  Comments:  12/19/2012 CST 9:49 AM Peace Qureshi  Marcaine and Celestone injection done on 08/17/2007.  Resolved  *[email or web address] - Bilateral pulmonary emboli: Onset on 5/15/2014 at 43 years.  Resolved on 5/16/2014 at 43 years.  Folliculitis (11287915): Onset in the month of 4/2008 at 37 years  Resolved.  Comments:  12/19/2012 CST 9:46 AM CST - Ibarra , Peace  Right thigh.  Irregular heartbeat (703934778): Onset on 4/17/2006 at 35 years.  Resolved.  Comments:  12/19/2012 CST 9:55 AM ANIYAH Ibarra  [facility]  Holter monitor placed.  Multiple pulmonary emboli (09131194):  Resolved.  Bilateral pulmonary embolism (17356090):  Resolved.   Family History:    Carpal Tunnel Syndrome  Mother  Asthma  Grandfather (P)  Daughter (Jose)  Hypertension  Mother  Heart disease  Father  CA - Cancer  Mother  Comments:  1/12/2016 3:42 PM Angeline Dawson  skin  Hypercholesterolemia  Mother  Parkinson disease  Mother  HTN - Hypertension  Father  Back  Mother  Father  Prostate cancer..  Uncle  Comments:  12/19/2012 9:23 AM Peace Qureshi  Prostate cancer in his 60s.  GERD - Gastro-esophageal reflux disease  Sister  ASHD - Atherosclerotic heart disease  Mother     Procedure history:    Screening for colon cancer (SNOMED CT 5826840849) performed by Silvio Burns MD on 3/19/2019 at 48 Years.  Comments:  3/26/2019 9:05 AM CDT - Shanika Shipman MA result:  negative  Recommendation:  f/u 3yrs  Cardiac computed tomography for calcium scoring (SNOMED CT 8455584520) on 1/24/2017 at 46 Years.  Comments:  2/2/2017 12:14 PM Adriane Ayala  Total Agatston calcium score is 18  Vasectomy (SNOMED CT 17433590) on 2/20/2009 at 38 Years.  Polysomnogram (SNOMED CT 730669067) on 4/21/2008 at 37 Years.  Comments:  12/19/2012 10:00 AM Peace Qureshi  Normal sleep architecture.  No evidence of obstructive sleep apnea.  Wrist injection (SNOMED CT 656952627) on 8/17/2007 at 36 Years.  Comments:  12/19/2012 9:50 AM Peace Qureshi  Marcaine and Celestone injection, left wrist, for carpal tunnel symptoms.  Holter monitor (SNOMED CT 954134835) performed by Britton Pepe MD on 4/20/2006 at 35 Years.  Comments:  12/19/2012 10:03 AM Peace Qureshi  Single 7-beat episode of asymptomatic slow atrial tachycardia with a rate of 102.  No other significant dysrhythmia.  Single episode of patient symptoms without dysrhythmia.  Cellulitis (SNOMED CT 7285266254) in 2005 at 35 Years.  Stress test ECG - treadmill (SNOMED CT 493610182) on  6/7/2005 at 34 Years.  Comments:  12/19/2012 10:06 AM CST - Peace Ibarra  Performed at [facility] for non-exertional chest discomfort.  Adenoidectomy (SNOMED CT 895985960).  Tonsillectomy (SNOMED CT 799889932).  Lymph node removal from axilla.   Social History:        Alcohol Assessment            Current, 2-4 TIMES PER WEEK, 1 drinks/episode average.      Tobacco Assessment            Never      Substance Abuse Assessment            Never      Employment and Education Assessment            Employed, Work/School description: .      Home and Environment Assessment            Marital status: .  Spouse/Partner name: Mera Caballero.  3 children.  Risks in environment: owns               secured gun.      Nutrition and Health Assessment            Type of diet: Regular.      Exercise and Physical Activity Assessment            Exercise frequency: 1-2 times/week.  Exercise type: Walking, elliptical.      Sexual Assessment            Sexually active: Yes.  Sexual orientation: Straight or heterosexual.  Contraceptive Use Details: vasectomy,               wife had hysterectomy.        Physical Examination   Vital Signs   1/21/2020 9:49 AM CST Temperature Tympanic 97.9 DegF    Peripheral Pulse Rate 72 bpm    HR Method Electronic    Systolic Blood Pressure 105 mmHg    Diastolic Blood Pressure 67 mmHg    Mean Arterial Pressure 80 mmHg    BP Method Electronic      Measurements from flowsheet : Measurements   1/21/2020 9:49 AM CST Height Measured - Standard 73 in    Weight Measured - Standard 266.6 lb    BSA 2.49 m2    Body Mass Index 35.17 kg/m2  HI      General:  Alert and oriented, No acute distress.    Eye:  Pupils are equal, round and reactive to light, Extraocular movements are intact.    HENT:  Normocephalic, Tympanic membranes are clear, Normal hearing, Oral mucosa is moist.    Neck:  Supple, Non-tender, No carotid bruit, No jugular venous distention, No lymphadenopathy, No thyromegaly.     Respiratory:  Lungs are clear to auscultation, Respirations are non-labored, Breath sounds are equal.    Cardiovascular:  Normal rate, Regular rhythm, No murmur, Good pulses equal in all extremities, No edema.    Gastrointestinal:  Soft, Non-distended, Normal bowel sounds, No organomegaly, Epigastric tenderness.    Lymphatics:  No lymphadenopathy neck, axilla, groin.    Musculoskeletal:  Normal range of motion, Normal strength, No tenderness, No swelling, degenerative changes noted.    Integumentary:  Warm, Dry, 3 mm pearly lesion right posterior shoulder   painted with betadine 1% lidocaine with epi for local punch bx done with 5mm punch closed with 4-0 nylon dressed with abx oint and dressed all done sterilly specimen sent to path.    Neurologic:  Alert, Oriented, Normal sensory, Normal motor function, No focal deficits, Cranial Nerves II-XII are grossly intact, Normal deep tendon reflexes.    Psychiatric:  Cooperative, Appropriate mood & affect, Normal judgment.       Review / Management   Results review:  Lab results   1/7/2020 8:36 AM CST Sodium Level 139 mmol/L    Potassium Level 4.3 mmol/L    Chloride Level 104 mmol/L    CO2 Level 28 mmol/L    Glucose Level 100 mg/dL  HI    BUN 18 mg/dL    Creatinine Level 1.04 mg/dL    BUN/Creat Ratio NOT APPLICABLE    eGFR 83 mL/min/1.73m2    eGFR African American 96 mL/min/1.73m2    Calcium Level 8.7 mg/dL    Hgb A1c 5.4    Cholesterol 156 mg/dL    Non-HDL Cholesterol 118    HDL 38 mg/dL  LOW    Cholesterol/HDL Ratio 4.1    LDL 93    Triglyceride 148 mg/dL   .       Impression and Plan   Diagnosis     Annual exam (YCS56-LQ Z00.00).     Obese (ZWD09-RS E66.9).     Hypercholesteremia (HEQ75-UP E78.00).     Dysthymia (WQK33-HX F34.1).     Bilateral pulmonary embolism (RFD98-TM I26.99).     Monoclonal gammopathy (KDL63-DE D47.2).     Changing skin lesion (JKK34-CF L98.9).     Back pain, chronic (KYV93-CT M54.9).     Course:  Progressing as expected.    Plan:  BMI,Weight  loss,exercise,diet discussed, fu heme  bleeding risk reviewed  skin care reviewed  rare use of flexeril and tramadol for back  fu 1 year.         Follow-up: With Primary Care Provider, Return to clinic, In 12 months.    Patient Instructions:       Counseled: Patient, Regarding diagnosis, Regarding medications, Verbalized understanding.

## 2022-02-16 NOTE — PROGRESS NOTES
Patient:   VIRGIL MATHEWS            MRN: [id number]            FIN: [id number]               Age:   47 years     Sex:  Male     :  1970   Associated Diagnoses:   Annual exam; Obese; Hypercholesteremia; HTN (Hypertension); Dysthymia; Bilateral pulmonary embolism; Monoclonal gammopathy   Author:   Silvio Burns MD      Visit Information      Date of Service: 2018 01:49 pm  Performing Location: [facility]  Encounter#: [id number]      Primary Care Provider (PCP):  Silvio Burns MD    NPSWATI# [id number]      Referring Provider:  Silvio Burns MD# [id number]   Visit type:  Annual exam.    Accompanied by:  No one.    Source of history:  Self.    History limitation:  None.       Chief Complaint   2018 1:57 PM CST    Px        Well Adult History   Well Adult History             The patient presents for well adult exam, fu htn and pe.  The patient's general health status is described as good.  The patient's diet is described as balanced.  Exercise: routine, aerobic activity.  Associated symptoms consist of weight loss.  Complaint: Taking medications as directed   long term xarelto for recurrent pe   tinea pedis long term  Seeing heme for  monoclonal jm  bp good dc ace  .  Additional pertinent history: occasional caffeine use, tobacco use none and alcohol use socially.  Notes.            Review of Systems   Constitutional:  No fever, No chills, No sweats, No weakness, No fatigue.    Eye:  No recent visual problem.    Ear/Nose/Mouth/Throat:  No sore throat.    Respiratory:  No shortness of breath, No cough, No sputum production.    Cardiovascular:  No chest pain, No peripheral edema.    Gastrointestinal:  Negative except as documented in history of present illness, No nausea, No vomiting, No diarrhea, No constipation.    Genitourinary:  No dysuria, No hematuria.    Musculoskeletal:  Negative except as documented in history of present illness, No back pain, No muscle pain.     Integumentary:  Negative except as documented in history of present illness, No rash.    Neurologic:  Alert and oriented X4.       Health Status   Allergies:    Allergic Reactions (Selected)  Severity Not Documented  Cats (No reactions were documented)  Citalopram (Hives)   Medications:  (Selected)   Prescriptions  Prescribed  Lipitor 20 mg oral tablet: 1 tab(s) ( 20 mg ), PO, Daily, # 90 tab(s), 1 Refill(s), Type: Maintenance, Pharmacy: [facility], 1 tab(s) po daily  Xarelto 20 mg oral tablet: 1 tab(s) ( 20 mg ), po, qpm, # 90 tab(s), 1 Refill(s), Type: Maintenance, Pharmacy: [facility], 1 tab(s) po qpm  cyclobenzaprine 10 mg oral tablet: See Instructions, Instructions: TAKE ONE TABLET BY MOUTH THREE TIMES A DAY AS NEEDED FOR PAIN, # 30 unknown unit, 1 Refill(s), Type: Soft Stop, Pharmacy: [facility]  econazole 1% topical cream: 1 modesto, TOP, BID, # 85 g, 5 Refill(s), Type: Maintenance, Pharmacy: [facility], 1 modesto top bid  ketorolac 0.5% ophthalmic solution: 1 drop(s), Both eyes, QID, PRN: for itching, # 5 mL, 6 Refill(s), Type: Maintenance, Pharmacy: [facility], 1 drop(s) both eyes qid,PRN:for itching  olopatadine 0.1% ophthalmic solution: 1 drop(s), both eyes, bid, PRN: for allergy symptoms, # 5 mL, 5 Refill(s), Type: Maintenance, Pharmacy: [facility], 1 drop(s) both eyes bid,PRN:for allergy symptoms  raNITIdine 300 mg oral tablet: 1 tab(s) ( 300 mg ), po, daily, # 90 tab(s), 1 Refill(s), Type: Maintenance, Pharmacy: [facility], 1 tab(s) po daily  traMADol 50 mg oral tablet: 1 tab(s) ( 50 mg ), PO, q4hr, PRN: for pain, # 60 tab(s), 1 Refill(s), Type: Maintenance, called to pharmacy (Rx)  venlafaxine 150 mg oral capsule, extended release: 1 cap(s) ( 150 mg ), po, daily, # 90 cap(s), 1 Refill(s), Type: Maintenance, Pharmacy: [facility], 1 cap(s) po daily  Documented Medications  Documented  Claritin 10 mg oral tablet: 1 tab(s) ( 10 mg ), po, daily, 0 Refill(s), Type:  Maintenance  Flonase 50 mcg/inh nasal spray: 2 spray(s), nasal, daily, 0 Refill(s), Type: Maintenance  Multiple Vitamins oral tablet: 1 tab(s), PO, Daily, 0 Refill(s)  Tylenol: PO, 0 Refill(s)   Problem list:    All Problems  Back pain, chronic / ICD-9-.5 / Confirmed  Carpal tunnel syndrome, left / ICD-9-.0 / Confirmed  Dysthymia / ICD-9-.4 / Confirmed  GERD (Gastroesophageal Reflux Disease) / ICD-9-.81 / Confirmed  HTN (Hypertension) / ICD-9-.9 / Confirmed  Low HDL (under 40) / SNOMED CT 140621456 / Confirmed  Monoclonal gammopathy / SNOMED CT 600207593 / Confirmed  Obese / ICD-9-.00 / Probable  Multiple pulmonary emboli / SNOMED CT 57690082 / Confirmed  Bilateral pulmonary embolism / SNOMED CT 44263738 / Confirmed  Seasonal Allergies / ICD-9-.9 / Confirmed  Snoring disorder / ICD-9-.09 / Confirmed  Inactive: Tinea pedis / SNOMED CT 57221636  Resolved: *[email or web address] - Bilateral pulmonary emboli  Resolved: Folliculitis / SNOMED CT 29349326  Resolved: Irregular Heartbeat / ICD-9-.9      Histories   Past Medical History:    Active  GERD (Gastroesophageal Reflux Disease) (530.81)  Low HDL (under 40) (347515576)  HTN (Hypertension) (401.9)  Seasonal Allergies (477.9)  Comments:  12/19/2012 CST 9:33 AM Peace Qureshi  Stable.  Back pain, chronic (724.5)  Comments:  12/19/2012 CST 9:54 AM Peace Qureshi  An MRI has been done previously, maybe in 2005.  Snoring disorder (786.09)  Comments:  12/19/2012 CST 9:40 AM Peace Qureshi  ENT evaluation by Dr. Jaleel Douglass on 05/07/2008.  A sleep study was performed; there was no evidence of apnea.    12/19/2012 CST 9:44 AM Peace Qureshi  This began after a severe pharyngitis.  Carpal tunnel syndrome, left (354.0)  Comments:  12/19/2012 CST 9:49 AM Peace Qureshiaine and Celestone injection done on 08/17/2007.  Resolved  *[email or web address] - Bilateral pulmonary emboli: Onset on 5/15/2014 at 43 years.   Resolved on 5/16/2014 at 43 years.  Folliculitis (10850430): Onset in the month of 4/2008 at 37 years  Resolved.  Comments:  12/19/2012 CST 9:46 AM Peace Qureshi  Right thigh.  Irregular Heartbeat (427.9): Onset on 4/17/2006 at 35 years.  Resolved.  Comments:  12/19/2012 CST 9:55 AM Peace Qureshi  Holter monitor placed.   Family History:    Carpal Tunnel Syndrome  Mother  Asthma  Grandfather (P)  Daughter (Jose)  Hypertension  Mother  Heart disease  Father  CA - Cancer  Mother  Comments:  1/12/2016 3:42 PM - Angeline Kong  skin  Hypercholesterolemia  Mother  Parkinson disease  Mother  HTN - Hypertension  Father  Back  Mother  Father  Prostate cancer..  Uncle  Comments:  12/19/2012 9:23 AM - Peace Ibarra  Prostate cancer in his 60s.  ASHD - Atherosclerotic heart disease  Mother     Procedure history:    Cardiac computed tomography for calcium scoring (SNOMED CT 1487208305) on 1/24/2017 at 46 Years.  Comments:  2/2/2017 12:14 PM - Adriane Hayden  Total Agatston calcium score is 18  Vasectomy (SNOMED CT 24002919) on 2/20/2009 at 38 Years.  Polysomnogram (SNOMED CT 961221428) on 4/21/2008 at 37 Years.  Comments:  12/19/2012 10:00 AM - Peace Ibarra  Normal sleep architecture.  No evidence of obstructive sleep apnea.  Wrist injection (SNOMED CT 050301994) on 8/17/2007 at 36 Years.  Comments:  12/19/2012 9:50 AM - Peace Ibarra  Marcaine and Celestone injection, left wrist, for carpal tunnel symptoms.  Holter monitor (SNOMED CT 970385096) performed by Britton Pepe MD on 4/20/2006 at 35 Years.  Comments:  12/19/2012 10:03 AM - Peace Ibarra  Single 7-beat episode of asymptomatic slow atrial tachycardia with a rate of 102.  No other significant dysrhythmia.  Single episode of patient symptoms without dysrhythmia.  Cellulitis (SNOMED CT 8307223225) in 2005 at 35 Years.  Stress test ECG - treadmill (SNOMED CT 734462615) on 6/7/2005 at 34 Years.  Comments:  12/19/2012 10:06 AM - Peace Ibarra  Performed at [facility]  Clinic for non-exertional chest discomfort.  Adenoidectomy (SNOMED CT 442591579).  Tonsillectomy (SNOMED CT 724571803).  Lymph node removal from axilla.   Social History:        Alcohol Assessment: Current            Current                     Comments:                      03/08/2017 - Adelia Manzo                     1-2 drinks 2-3 times per week      Tobacco Assessment: Denies Tobacco Use            Never      Substance Abuse Assessment: Denies Substance Abuse            Never      Employment and Education Assessment            Employed, Work/School description: .      Home and Environment Assessment            Marital status: .  Spouse/Partner name: Mera Caballero.  3 children.      Nutrition and Health Assessment            Type of diet: Regular.        Physical Examination   Vital Signs   1/16/2018 1:57 PM CST Temperature Tympanic 97.3 DegF  LOW    Peripheral Pulse Rate 78 bpm    HR Method Electronic    Systolic Blood Pressure 94 mmHg    Diastolic Blood Pressure 58 mmHg  LOW    Mean Arterial Pressure 70 mmHg    BP Method Electronic      Measurements from flowsheet : Measurements   1/16/2018 1:57 PM CST Height Measured - Standard 73 in    Weight Measured - Standard 236.2 lb    BSA 2.35 m2    Body Mass Index 31.16 kg/m2  HI      General:  Alert and oriented, No acute distress.    Eye:  Pupils are equal, round and reactive to light, Extraocular movements are intact.    HENT:  Normocephalic, Tympanic membranes are clear, Normal hearing, Oral mucosa is moist.    Neck:  Supple, Non-tender, No carotid bruit, No jugular venous distention, No lymphadenopathy, No thyromegaly.    Respiratory:  Lungs are clear to auscultation, Respirations are non-labored, Breath sounds are equal.    Cardiovascular:  Normal rate, Regular rhythm, No murmur, Good pulses equal in all extremities, No edema.    Gastrointestinal:  Soft, Non-distended, Normal bowel sounds, No organomegaly, Epigastric tenderness.     Lymphatics:  No lymphadenopathy neck, axilla, groin.    Musculoskeletal:  Normal range of motion, Normal strength, No tenderness, No swelling, degenerative changes noted.    Integumentary:  Warm, Dry, tinea pedis.    Neurologic:  Alert, Oriented, Normal sensory, Normal motor function, No focal deficits, Cranial Nerves II-XII are grossly intact, Normal deep tendon reflexes.    Psychiatric:  Cooperative, Appropriate mood & affect, Normal judgment.       Review / Management   Results review:  Lab results   1/9/2018 10:30 AM CST Protein Electrophoresis Interp INTERPRETATION    Protein Total 6.9 gm/dL    Albumin Level 4.2 gm/dL    Immunoglob IgA 266 mg/dL    Immunoglob IgG 1,246 mg/dL    Immunoglob IgM 81 mg/dL    Alpha 1 Globulin 0.2 gm/dL    Alpha 2 Globulin 0.5 gm/dL    Beta 1 Globulin 0.4 gm/dL    Beta 2 Globulin 0.4 gm/dL    Gamma Globulin 1.2 gm/dL    Abnormal Band 0.6 gm/dL  HI   1/9/2018 9:36 AM CST Sodium Level 140 mmol/L    Potassium Level 4.6 mmol/L    Chloride Level 106 mmol/L    CO2 Level 30 mmol/L    Glucose Level 95 mg/dL    BUN 18 mg/dL    Creatinine Level 1.00 mg/dL    BUN/Creat Ratio NOT APPLICABLE    eGFR 88 mL/min/1.73m2    eGFR African American 102 mL/min/1.73m2    Calcium Level 9.5 mg/dL    Bilirubin Total 0.5 mg/dL    Alkaline Phosphatase 35 unit/L  LOW    AST/SGOT 25 unit/L    ALT/SGPT 34 unit/L    Protein Total 7.0 gm/dL    Albumin Level 4.4 gm/dL    Globulin 2.6    A/G Ratio 1.7    Hgb A1c 5.2    Free Kappa Light Chains 21.8 mg/L  HI    Free Lambda Light Chains 14.8 mg/L    Free Kappa/Lambda Ratio 1.47    Cholesterol 113 mg/dL    Non-HDL Cholesterol 82    HDL 31 mg/dL  LOW    Cholesterol/HDL Ratio 3.6    LDL 69    Triglyceride 58 mg/dL    WBC 3.5  LOW    RBC 4.58    Hgb 14.6 gm/dL    Hct 41.9 %    MCV 91.5 fL    MCH 31.9 pg    MCHC 34.8 gm/dL    RDW 11.6 %    Platelet 249    MPV 9.4 fL    Lymphocytes 43.9 %    Abs Lymphocytes 1,537    Neutrophils 44.4 %    Abs Neutrophils 1,554    Monocytes  9.7 %    Abs Monocytes 340    Eosinophils 1.1 %    Abs Eosinophils 39    Basophils 0.9 %    Abs Basophils 32   .       Impression and Plan   Diagnosis     Annual exam (VNE00-PD Z00.00).     Obese (PDO84-FR E66.9).     Hypercholesteremia (LSY46-CC E78.0).     HTN (Hypertension) (NWQ84-YR I10).     Dysthymia (CCI19-FL F34.1).     Bilateral pulmonary embolism (WZP18-IX I26.99).     Monoclonal gammopathy (GIY46-SZ D47.2).     Course:  Progressing as expected.    Plan:  BMI,Weight loss,exercise,diet discussed, fu heme  bleeding risk reviewed  skin care reviewed  fu 1 year.         Follow-up: With Primary Care Provider, Return to clinic, In 6 months.    Patient Instructions:       Counseled: Patient, Regarding diagnosis, Regarding medications, Verbalized understanding.

## 2022-02-16 NOTE — NURSING NOTE
Depression Screening Entered On:  1/18/2019 9:37 AM CST    Performed On:  1/17/2019 9:37 AM CST by Tori Szymanski               Depression Screening   Feeling Down, Depressed, Hopeless :   Several days   Little Interest - Pleasure in Activities :   Several days   Initial Depression Screen Score :   2    Trouble Falling or Staying Asleep :   Several days   Feeling Tired or Little Energy :   Several days   Poor Appetite or Overeating :   Not at all   Feeling Bad About Yourself :   Several days   Trouble Concentrating :   Not at all   Moving or Speaking Slowly :   Not at all   Thoughts Better Off Dead or Hurting Self :   Not at all   Detailed Depression Screen Score :   3    Total Depression Screen Score :   5    OPAL Difficulty with Work, Home, Others :   Not difficult at all   Tori Szymanski - 1/18/2019 9:37 AM CST

## 2022-02-16 NOTE — TELEPHONE ENCOUNTER
Entered by Peace Ayon CMA on June 25, 2020 8:11:03 AM CDT  ---------------------  From: Peace Ayon CMA   To: [facility]    Sent: 6/25/2020 8:11:03 AM CDT  Subject: Medication Management     ** Not Approved: Refill not appropriate, filled 1/21/2020 with 11 refills **  olopatadine ophthalmic (OLOPATADINE HCL 0.1% SOLN)  INSTILL ONE DROP TO THE AFFECTED EYE(S) EVERY DAY BOTH EYES TWO TIMES A DAY AS NEEDED FOR ALLERGY SYMPTOMS  Qty:  5 mL        Days Supply:  30        Refills:  11          Substitutions Allowed     Route To Pharmacy - [facility]   Signed by Peace Ayon CMA            ------------------------------------------  From: [facility]  To: Silvio Burns MD  Sent: June 22, 2020 8:00:32 AM CDT  Subject: Medication Management  Due: June 17, 2020 4:19:06 PM CDT     ** On Hold Pending Signature **     Drug: olopatadine ophthalmic (olopatadine 0.1% ophthalmic solution), INSTILL ONE DROP TO THE AFFECTED EYE(S) EVERY DAY BOTH EYES TWO TIMES A DAY AS NEEDED FOR ALLERGY SYMPTOMS  Quantity: 5 mL  Days Supply: 30  Refills: 11  Substitutions Allowed  Notes from Pharmacy:     Dispensed Drug: olopatadine ophthalmic (olopatadine 0.1% ophthalmic solution), INSTILL ONE DROP TO THE AFFECTED EYE(S) EVERY DAY BOTH EYES TWO TIMES A DAY AS NEEDED FOR ALLERGY SYMPTOMS  Quantity: 5 mL  Days Supply: 30  Refills: 11  Substitutions Allowed  Notes from Pharmacy:  ------------------------------------------

## 2022-02-16 NOTE — TELEPHONE ENCOUNTER
Entered by Shanika Shipman MA on May 06, 2020 10:10:16 AM CDT  ---------------------  From: Shanika Shipman MA   To: [facility]    Sent: 5/6/2020 10:10:16 AM CDT  Subject: Medication Management     ** Not Approved: Refill not appropriate, was refilled 1/21/2020 for #90 w/ 3 refills **  famotidine (FAMOTIDINE 40MG TABS)  TAKE ONE TABLET BY MOUTH EVERY DAY  Qty:  90 tab(s)        Days Supply:  90        Refills:  3          Substitutions Allowed     Route To Pharmacy - [facility]   Signed by Shanika Shipman MA            ------------------------------------------  From: [facility]  To: Silvio Burns MD  Sent: May 5, 2020 1:23:48 PM CDT  Subject: Medication Management  Due: May 6, 2020 1:23:48 PM CDT    ** On Hold Pending Signature **  Drug: famotidine (famotidine 40 mg oral tablet)  TAKE ONE TABLET BY MOUTH EVERY DAY  Quantity: 90 tab(s)  Days Supply: 90  Refills: 3  Substitutions Allowed  Notes from Pharmacy:     Dispensed Drug: famotidine (famotidine 40 mg oral tablet)  TAKE ONE TABLET BY MOUTH EVERY DAY  Quantity: 90 tab(s)  Days Supply: 90  Refills: 3  Substitutions Allowed  Notes from Pharmacy:   ------------------------------------------

## 2022-02-16 NOTE — TELEPHONE ENCOUNTER
Entered by Silverio Bowens CMA on June 16, 2020 2:54:39 PM CDT  PCP:   MICHELLE    Medication:   tramadol  Last Filled:  6/27/18    Quantity:  60  Refills:  1    Date of last office visit and reason:   6/16/20 Shoulder pain/injection with ARIANA    Date of last Med Check / Px:   1/21/20 with TFS  Date of last labs pertaining to condition:      Note:  Please advise on refills.  Silverio Bowens CMA    Return to Clinic order placed?  yes 1 year for Px    Resource:   _  Phone:   _  Fax:  _          ** Patient matched by Silverio Bowens CMA on 6/16/2020 2:51:24 PM CDT **      ------------------------------------------  From: [facility]  To: Silvio Burns MD  Sent: June 16, 2020 12:37:01 PM CDT  Subject: Medication Management  Due: June 17, 2020 2:40:15 AM CDT     ** On Hold Pending Signature **     Drug: traMADol (traMADol 50 mg oral tablet), TAKE ONE TABLET BY MOUTH EVERY 4 HOURS AS NEEDED FOR PAIN  Quantity: 60 tab(s)  Days Supply: 10  Refills: 0  Substitutions Allowed  Notes from Pharmacy:     Dispensed Drug: traMADol (traMADol 50 mg oral tablet), TAKE ONE TABLET BY MOUTH EVERY 4 HOURS AS NEEDED FOR PAIN  Quantity: 60 tab(s)  Days Supply: 10  Refills: 0  Substitutions Allowed  Notes from Pharmacy:  ---------------------------------------------------------------  From: Silverio Bowens CMA (eRx Pool ([id number]_[place] [employer] [place]))   To: [facility]LE Message Pool ([id number]_[place] [employer] [place]);     Sent: 6/16/2020 2:54:52 PM CDT  Subject: FW: Medication Management   Due Date/Time: 6/17/2020 12:37:00 PM CDT---------------------  From: Michele Gross ([employer] Message Pool ([id number]_[facility]))   To: Silvio Burns MD;     Sent: 6/16/2020 4:56:41 PM CDT  Subject: FW: Medication Management   Due Date/Time: 6/17/2020 12:37:00 PM CDT---------------------  From: Silvio Burns MD   To: [employer] Message Pool ([id number] [facility] [place]);     Sent: 6/16/2020 5:25:35 PM CDT  Subject: RE: Medication Management     done

## 2022-02-16 NOTE — PROGRESS NOTES
Patient:   VIRGIL MATHEWS            MRN: [id number]            FIN: [id number]               Age:   49 years     Sex:  Male     :  1970   Associated Diagnoses:   Periorbital cellulitis   Author:   Ryder Coello MD      Visit Information      Date of Service: 2019 09:23 am  Performing Location: [facility]  Encounter#: [id number]      Primary Care Provider (PCP):  Silvio Burns MD    NPI# [id number]      Referring Provider:  Ryder Coello MD    NPI# [id number]      Subjective   Chief complaint 2019 9:37 AM CST   c/o right eye lid swelling. started yesterday  .  Additional information see chief complaint as noted above and confirmed with the patient  no visual difficulty  no trauma or exposure  .        Health Status   Allergies:    Allergic Reactions (Selected)  Severity Not Documented  Cats (No reactions were documented)  Citalopram (Hives)   Medications:  (Selected)   Prescriptions  Prescribed  Lipitor 20 mg oral tablet: = 1 tab(s) ( 20 mg ), PO, Daily, # 90 tab(s), 3 Refill(s), Type: Maintenance, Pharmacy: [facility], 1 tab(s) Oral daily  Pepcid 40 mg oral tablet: = 1 tab(s) ( 40 mg ), Oral, daily, # 90 tab(s), 3 Refill(s), Type: Maintenance, Pharmacy: [facility], to replace ranitidine, 1 tab(s) Oral daily,x90 day(s)  Xarelto 20 mg oral tablet: = 1 tab(s) ( 20 mg ), po, qpm, # 90 tab(s), 3 Refill(s), Type: Maintenance, Pharmacy: [facility], 1 tab(s) Oral qpm  cyclobenzaprine 10 mg oral tablet: See Instructions, Instructions: TAKE ONE TABLET BY MOUTH THREE TIMES A DAY AS NEEDED FOR PAIN, # 30 tab(s), 1 Refill(s), Type: Soft Stop, Pharmacy: [facility]  ketorolac 0.5% ophthalmic solution: 1 drop(s), Eye-Both, qid, PRN: as needed for itching, # 5 mL, 11 Refill(s), Type: Soft Stop, Pharmacy: [facility], 1 drop(s) Eye-Both qid,PRN:as needed for itching  olopatadine 0.1% ophthalmic solution: 1 drop(s), both eyes, bid, PRN: for allergy  symptoms, # 5 mL, 11 Refill(s), Type: Maintenance, Pharmacy: [facility], 1 drop(s) Eye-Both bid,PRN:for allergy symptoms  traMADol 50 mg oral tablet: 1 tab(s) ( 50 mg ), PO, q4hr, PRN: for pain, # 60 tab(s), 1 Refill(s), Type: Maintenance, called to pharmacy (Rx)  venlafaxine 150 mg oral capsule, extended release: = 1 cap(s) ( 150 mg ), po, daily, # 90 cap(s), 3 Refill(s), Type: Maintenance, Pharmacy: [facility], 1 cap(s) Oral daily  Documented Medications  Documented  Claritin 10 mg oral tablet: 1 tab(s) ( 10 mg ), po, daily, 0 Refill(s), Type: Maintenance  Flonase 50 mcg/inh nasal spray: 2 spray(s), nasal, daily, 0 Refill(s), Type: Maintenance  Multiple Vitamins oral tablet: 1 tab(s), PO, Daily, 0 Refill(s)  Tylenol: PO, 0 Refill(s)  clotrimazole 1% topical cream: Topical, bid, Instructions: to replace Econazole 1% cream which is not covered by insurance., 0 Refill(s), Type: Maintenance  predniSONE 1 mg oral tablet: = 3 tab(s) ( 3 mg ), Oral, daily, Instructions: (tapering by [facility]), 0 Refill(s), Type: Maintenance,    Medications          *denotes recorded medication          *Tylenol: PO.          Lipitor 20 mg oral tablet: 20 mg, 1 tab(s), PO, Daily, 90 tab(s), 3 Refill(s).          *clotrimazole 1% topical cream: Topical, bid, to replace Econazole 1% cream which is not covered by insurance., 0 Refill(s).          cyclobenzaprine 10 mg oral tablet: See Instructions, TAKE ONE TABLET BY MOUTH THREE TIMES A DAY AS NEEDED FOR PAIN, 30 tab(s), 1 Refill(s).          Pepcid 40 mg oral tablet: 40 mg, 1 tab(s), Oral, daily, for 90 day(s), 90 tab(s), 3 Refill(s).          *Flonase 50 mcg/inh nasal spray: 2 spray(s), nasal, daily, 0 Refill(s).          ketorolac 0.5% ophthalmic solution: 1 drop(s), Eye-Both, qid, PRN: as needed for itching, 5 mL, 11 Refill(s).          *Claritin 10 mg oral tablet: 10 mg, 1 tab(s), po, daily.          *Multiple Vitamins oral tablet: 1 tab(s), PO, Daily.           olopatadine 0.1% ophthalmic solution: 1 drop(s), both eyes, bid, PRN: for allergy symptoms, 5 mL, 11 Refill(s).          *predniSONE 1 mg oral tablet: 3 mg, 3 tab(s), Oral, daily, (tapering by [facility]), 0 Refill(s).          Xarelto 20 mg oral tablet: 20 mg, 1 tab(s), po, qpm, 90 tab(s), 3 Refill(s).          traMADol 50 mg oral tablet: 50 mg, 1 tab(s), PO, q4hr, PRN: for pain, 60 tab(s), 1 Refill(s).          venlafaxine 150 mg oral capsule, extended release: 150 mg, 1 cap(s), po, daily, 90 cap(s), 3 Refill(s).       Problem list:    All Problems (Selected)  GERD (gastroesophageal reflux disease) / 739873329 / Confirmed  Low HDL (under 40) / 341882054 / Confirmed  Dysthymia / 240755692 / Confirmed  Obese / 3328771681 / Probable  Seasonal allergies / 737715387 / Confirmed  Stable.  Back pain, chronic / 427463409 / Confirmed  An MRI has been done previously, maybe in 2005.  Snoring disorder / 786.09 / Confirmed  ENT evaluation by Dr. Jaleel Douglass on 05/07/2008.  A sleep study was performed; there was no evidence of apnea.  This began after a severe pharyngitis.  Carpal tunnel syndrome, left / 44281179 / Confirmed  Marcaine and Celestone injection done on 08/17/2007.  Monoclonal gammopathy / 960457703 / Confirmed  Hx pulmonary embolism / 020008212 / Confirmed      Objective   Vital Signs   12/24/2019 9:37 AM CST Temperature Tympanic 97.5 DegF  LOW    Peripheral Pulse Rate 74 bpm    Systolic Blood Pressure 126 mmHg    Diastolic Blood Pressure 68 mmHg    Mean Arterial Pressure 87 mmHg      Measurements from flowsheet : Measurements   12/24/2019 9:37 AM CST Height Measured 73 in    Weight Measured 264 lb    BSA 2.48 m2    Body Mass Index 34.83 kg/m2  HI      General:  Alert and oriented, No acute distress.    HENT:  mild right eyelid upper swelling and pink color  conjunctiv is clear  PERRLA EOMI.    Integumentary:  Warm.    Psychiatric:  Cooperative, Appropriate mood & affect.       Impression and Plan   Assessment  and Plan:          Diagnosis: Periorbital cellulitis (ZNJ08-OM L03.213).         Course: will benefit from compresses  would use keflex 500 tid for 7 days.

## 2022-02-16 NOTE — PROGRESS NOTES
Patient:   VIRGIL MATHEWS            MRN: [id number]            FIN: [id number]               Age:   48 years     Sex:  Male     :  1970   Associated Diagnoses:   Cyst of tendon sheath; Monoclonal gammopathy; Hx pulmonary embolism   Author:   Silvio Burns MD      Preoperative Information   Indication for surgery:  cyst finger.    Accompanied by:  No one.    Source of history:  Self.           Chief Complaint   2019 8:08 AM CDT     Preop. 19  [facility]. Cyst on right hand.  Dr. Oliver        Review of Systems   Constitutional:  Negative.    Eye:  Negative.    Ear/Nose/Mouth/Throat:  Negative.    Respiratory:  Negative.    Cardiovascular:  Negative.    Gastrointestinal:  Negative.    Genitourinary:  Negative.    Hematology/Lymphatics:  Negative.    Endocrine:  Negative.    Immunologic:  Negative.    Musculoskeletal:  Negative.    Integumentary:  Negative.    Neurologic:  Negative.       Health Status   Allergies:    Allergic Reactions (Selected)  Severity Not Documented  Cats (No reactions were documented)  Citalopram (Hives)   Medications:  (Selected)   Prescriptions  Prescribed  Lipitor 20 mg oral tablet: = 1 tab(s) ( 20 mg ), PO, Daily, # 90 tab(s), 3 Refill(s), Type: Maintenance, Pharmacy: [facility], 1 tab(s) Oral daily  Xarelto 20 mg oral tablet: = 1 tab(s) ( 20 mg ), po, qpm, # 90 tab(s), 3 Refill(s), Type: Maintenance, Pharmacy: [facility], 1 tab(s) Oral qpm  cyclobenzaprine 10 mg oral tablet: See Instructions, Instructions: TAKE ONE TABLET BY MOUTH THREE TIMES A DAY AS NEEDED FOR PAIN, # 30 tab(s), 1 Refill(s), Type: Soft Stop, Pharmacy: [facility]  ketorolac 0.5% ophthalmic solution: 1 drop(s), Eye-Both, qid, PRN: as needed for itching, # 5 mL, 11 Refill(s), Type: Soft Stop, Pharmacy: [facility], 1 drop(s) Eye-Both qid,PRN:as needed for itching  olopatadine 0.1% ophthalmic solution: 1 drop(s), both eyes, bid, PRN: for allergy symptoms, # 5 mL,  11 Refill(s), Type: Maintenance, Pharmacy: [facility], 1 drop(s) Eye-Both bid,PRN:for allergy symptoms  raNITIdine 300 mg oral tablet: = 1 tab(s) ( 300 mg ), po, daily, # 90 tab(s), 3 Refill(s), Type: Maintenance, Pharmacy: [facility], 1 tab(s) Oral daily  traMADol 50 mg oral tablet: 1 tab(s) ( 50 mg ), PO, q4hr, PRN: for pain, # 60 tab(s), 1 Refill(s), Type: Maintenance, called to pharmacy (Rx)  venlafaxine 150 mg oral capsule, extended release: = 1 cap(s) ( 150 mg ), po, daily, # 90 cap(s), 3 Refill(s), Type: Maintenance, Pharmacy: [facility], 1 cap(s) Oral daily  Documented Medications  Documented  Claritin 10 mg oral tablet: 1 tab(s) ( 10 mg ), po, daily, 0 Refill(s), Type: Maintenance  Flonase 50 mcg/inh nasal spray: 2 spray(s), nasal, daily, 0 Refill(s), Type: Maintenance  Multiple Vitamins oral tablet: 1 tab(s), PO, Daily, 0 Refill(s)  Tylenol: PO, 0 Refill(s)  clotrimazole 1% topical cream: Topical, bid, Instructions: to replace Econazole 1% cream which is not covered by insurance., 0 Refill(s), Type: Maintenance,    Medications          *denotes recorded medication          *Tylenol: PO.          Lipitor 20 mg oral tablet: 20 mg, 1 tab(s), PO, Daily, 90 tab(s), 3 Refill(s).          *clotrimazole 1% topical cream: Topical, bid, to replace Econazole 1% cream which is not covered by insurance., 0 Refill(s).          cyclobenzaprine 10 mg oral tablet: See Instructions, TAKE ONE TABLET BY MOUTH THREE TIMES A DAY AS NEEDED FOR PAIN, 30 tab(s), 1 Refill(s).          *Flonase 50 mcg/inh nasal spray: 2 spray(s), nasal, daily, 0 Refill(s).          ketorolac 0.5% ophthalmic solution: 1 drop(s), Eye-Both, qid, PRN: as needed for itching, 5 mL, 11 Refill(s).          *Claritin 10 mg oral tablet: 10 mg, 1 tab(s), po, daily.          *Multiple Vitamins oral tablet: 1 tab(s), PO, Daily.          olopatadine 0.1% ophthalmic solution: 1 drop(s), both eyes, bid, PRN: for allergy symptoms, 5 mL, 11  Refill(s).          raNITIdine 300 mg oral tablet: 300 mg, 1 tab(s), po, daily, 90 tab(s), 3 Refill(s).          Xarelto 20 mg oral tablet: 20 mg, 1 tab(s), po, qpm, 90 tab(s), 3 Refill(s).          traMADol 50 mg oral tablet: 50 mg, 1 tab(s), PO, q4hr, PRN: for pain, 60 tab(s), 1 Refill(s).          venlafaxine 150 mg oral capsule, extended release: 150 mg, 1 cap(s), po, daily, 90 cap(s), 3 Refill(s).     Problem list:    All Problems  Carpal tunnel syndrome, left / SNOMED CT 64195799 / Confirmed  Back pain, chronic / SNOMED CT 110714969 / Confirmed  Dysthymia / SNOMED CT 444264987 / Confirmed  GERD (gastroesophageal reflux disease) / SNOMED CT 546039693 / Confirmed  Hx pulmonary embolism / SNOMED CT 457802987 / Confirmed  Low HDL (under 40) / SNOMED CT 210103404 / Confirmed  Monoclonal gammopathy / SNOMED CT 617397639 / Confirmed  Obese / SNOMED CT 7939627707 / Probable  Seasonal allergies / SNOMED CT 556795408 / Confirmed  Snoring disorder / ICD-9-.09 / Confirmed  Inactive: Tinea pedis / SNOMED CT 86525277  Resolved: *Hospitalized@[facility] - Bilateral pulmonary emboli  Resolved: Folliculitis / SNOMED CT 95043448  Resolved: Irregular heartbeat / SNOMED CT 919452039  Resolved: Multiple pulmonary emboli / SNOMED CT 59407210  Resolved: Bilateral pulmonary embolism / SNOMED CT 57032112  Canceled: HTN (hypertension) / SNOMED CT 2624094276      Histories   Past Medical History:    Active  Dysthymia (928590954): Onset on 7/22/2010 at 39 years.  Comments:  7/22/2010 CDT 1:18 PM CDT -     GERD (gastroesophageal reflux disease) (064386735)  Low HDL (under 40) (546711690)  Obese (2256812720)  Seasonal allergies (145722532)  Comments:  12/19/2012 CST 9:33 AM CST - Peace Ibarra.  Back pain, chronic (555611994)  Comments:  12/19/2012 CST 9:54 AM Peace Qureshi  An MRI has been done previously, maybe in 2005.  Snoring disorder (786.09)  Comments:  12/19/2012 CST 9:40 AM Peace Qureshi  ENT evaluation by Dr. Marmolejo  Rafat on 05/07/2008.  A sleep study was performed; there was no evidence of apnea.    12/19/2012 CST 9:44 AM Peace Qureshi  This began after a severe pharyngitis.  Carpal tunnel syndrome, left (09492261)  Comments:  12/19/2012 CST 9:49 AM Peace Qureshi  Marcaine and Celestone injection done on 08/17/2007.  Resolved  *Hospitalized@[facility] - Bilateral pulmonary emboli: Onset on 5/15/2014 at 43 years.  Resolved on 5/16/2014 at 43 years.  Folliculitis (42707564): Onset in the month of 4/2008 at 37 years  Resolved.  Comments:  12/19/2012 CST 9:46 AM Peace Qureshi  Right thigh.  Irregular heartbeat (483907360): Onset on 4/17/2006 at 35 years.  Resolved.  Comments:  12/19/2012 CST 9:55 AM Peace Qureshi  Holter monitor placed.  Multiple pulmonary emboli (50105756):  Resolved.  Bilateral pulmonary embolism (12408055):  Resolved.   Family History:    Carpal Tunnel Syndrome  Mother  Asthma  Grandfather (P)  Daughter (Jose)  Hypertension  Mother  Heart disease  Father  CA - Cancer  Mother  Comments:  1/12/2016 3:42 PM CST - Angeline Kong  skin  Hypercholesterolemia  Mother  Parkinson disease  Mother  HTN - Hypertension  Father  Back  Mother  Father  Prostate cancer..  Uncle  Comments:  12/19/2012 9:23 AM Peace Qureshi  Prostate cancer in his 60s.  GERD - Gastro-esophageal reflux disease  Sister  ASHD - Atherosclerotic heart disease  Mother     Procedure history:    Screening for colon cancer (SNOMED CT 5231550164) performed by Silvio Burns MD on 3/19/2019 at 48 Years.  Comments:  3/26/2019 9:05 AM CDT - Shanika Shipman MA result:  negative  Recommendation:  f/u 3yrs  Cardiac computed tomography for calcium scoring (SNOMED CT 9897719500) on 1/24/2017 at 46 Years.  Comments:  2/2/2017 12:14 PM CST - Adriane Hayden  Total Agatston calcium score is 18  Vasectomy (SNOMED CT 64573453) on 2/20/2009 at 38 Years.  Polysomnogram (SNOMED CT 500261071) on 4/21/2008 at 37 Years.  Comments:  12/19/2012  10:00 AM Peace Qureshi  Normal sleep architecture.  No evidence of obstructive sleep apnea.  Wrist injection (SNOMED CT 936409383) on 8/17/2007 at 36 Years.  Comments:  12/19/2012 9:50 AM Peace Qureshi  Marcaine and Celestone injection, left wrist, for carpal tunnel symptoms.  Holter monitor (SNOMED CT 736310579) performed by Britton Pepe MD on 4/20/2006 at 35 Years.  Comments:  12/19/2012 10:03 AM Peace Qureshi  Single 7-beat episode of asymptomatic slow atrial tachycardia with a rate of 102.  No other significant dysrhythmia.  Single episode of patient symptoms without dysrhythmia.  Cellulitis (SNOMED CT 2647608601) in 2005 at 35 Years.  Stress test ECG - treadmill (SNOMED CT 896286494) on 6/7/2005 at 34 Years.  Comments:  12/19/2012 10:06 AM Peace Qureshi  Performed at [facility] for non-exertional chest discomfort.  Adenoidectomy (SNOMED CT 045671378).  Tonsillectomy (SNOMED CT 988393461).  Lymph node removal from axilla.   Social History:        Alcohol Assessment            Current, 2-4 TIMES PER WEEK, 1 drinks/episode average.      Tobacco Assessment            Never      Substance Abuse Assessment            Never      Employment and Education Assessment            Employed, Work/School description: .      Home and Environment Assessment            Marital status: .  Spouse/Partner name: Mera Caballero.  3 children.  Risks in environment: owns               secured gun.      Nutrition and Health Assessment            Type of diet: Regular.      Exercise and Physical Activity Assessment            Exercise frequency: 1-2 times/week.  Exercise type: Walking, elliptical.      Sexual Assessment            Sexually active: Yes.  Sexual orientation: Straight or heterosexual.  Contraceptive Use Details: vasectomy,               wife had hysterectomy.     Has no history of anemia.  Has a  history of DVT or pulmonary embolism.  Has no personal history of bleeding  problems.   Has no personal or family history of anesthesia reactions.  Patient  does not have active tuberculosis.    S/he has not taken aspirin or aspirin containing products in the last week.     S/he has not taken Plavix (Clopidogrel) in the last 2 weeks.    S/he has not taken warfarin in the past week.    S/he  has not been on corticosteroids for more than 2 weeks recently.      S/he  is not DNR before, during or after surgery.    Chest pain / SOB walking up 2 flights of steps? no  Pain in neck or jaw?no  CAD MI?  no  Afib? no  Heart Failure?no  Asthma  or Bronchitis? no  Diabetes? no       Insulin/Orals?   Seizure Disorder? no  CKD?no  Thyroid Disease?no  Liver Diseaseno  CVA?no         Physical Examination   Vital Signs   6/6/2019 8:08 AM CDT Temperature Tympanic 97.4 DegF  LOW    Peripheral Pulse Rate 64 bpm    HR Method Electronic    Systolic Blood Pressure 114 mmHg    Diastolic Blood Pressure 73 mmHg    Mean Arterial Pressure 87 mmHg    BP Method Electronic      Measurements from flowsheet : Measurements   6/6/2019 8:08 AM CDT     Weight Measured - Standard                253.2 lb     General:  Alert and oriented, No acute distress.    Eye:  Pupils are equal, round and reactive to light, Extraocular movements are intact.    HENT:  Normocephalic, Tympanic membranes are clear, Normal hearing, Oral mucosa is moist.    Neck:  Supple, Non-tender, No carotid bruit, No jugular venous distention, No lymphadenopathy, No thyromegaly.    Respiratory:  Lungs are clear to auscultation, Respirations are non-labored, Breath sounds are equal.    Cardiovascular:  Normal rate, Regular rhythm, No murmur, Good pulses equal in all extremities, No edema.    Gastrointestinal:  Soft, Non-tender, Non-distended, Normal bowel sounds, No organomegaly.    Musculoskeletal:  Normal range of motion, Normal strength, No tenderness, No swelling, No deformity.    Integumentary:  Warm, Dry.    Neurologic:  Alert, Oriented, Normal sensory,  Normal motor function, No focal deficits, Cranial Nerves II-XII are grossly intact, Normal deep tendon reflexes.    Psychiatric:  Cooperative, Appropriate mood & affect, Normal judgment.       Health Maintenance      Recommendations     Pending (in the next year)        Due            Aspirin Therapy for Prevention of CVD (Male) due  06/06/19  and every 5  year(s)        Due In Future            Influenza Vaccine not due until  09/01/19  and every 1  year(s)           Lipid Disorders Screen (Male) not due until  01/03/20  and every 1  year(s)           Depression Screen (Male) not due until  01/17/20  and every 1  year(s)           Body Mass Index Check (Male) not due until  01/17/20  and every 1  year(s)     Satisfied (in the past 1 year)        Satisfied            Alcohol Misuse Screen (Male) on  01/17/19.           Body Mass Index Check (Male) on  01/17/19.           Body Mass Index Check (Male) on  11/01/18.           Body Mass Index Check (Male) on  06/07/18.           Colorectal Cancer Screen (Occult Blood) (Male) on  03/19/19.           Depression Screen (Male) on  01/17/19.           Depression Screen (Male) on  01/17/19.           Depression Screen (Male) on  01/17/19.           High Blood Pressure Screen (Male) on  06/06/19.           High Blood Pressure Screen (Male) on  05/16/19.           High Blood Pressure Screen (Male) on  03/21/19.           High Blood Pressure Screen (Male) on  01/17/19.           High Blood Pressure Screen (Male) on  11/01/18.           High Blood Pressure Screen (Male) on  07/10/18.           High Blood Pressure Screen (Male) on  06/07/18.           Influenza Vaccine on  10/01/18.           Lipid Disorders Screen (Male) on  01/03/19.           Lipid Disorders Screen (Male) on  01/03/19.           Lipid Disorders Screen (Male) on  01/03/19.           Lipid Disorders Screen (Male) on  01/03/19.           Obesity Screen and Counseling (Male) on  06/06/19.           Obesity Screen  and Counseling (Male) on  05/16/19.           Obesity Screen and Counseling (Male) on  03/21/19.           Obesity Screen and Counseling (Male) on  01/17/19.           Obesity Screen and Counseling (Male) on  11/01/18.           Obesity Screen and Counseling (Male) on  07/10/18.           Obesity Screen and Counseling (Male) on  06/07/18.           Tobacco Use Screen (Male) on  06/06/19.           Tobacco Use Screen (Male) on  01/17/19.           Tobacco Use Screen (Male) on  11/01/18.           Tobacco Use Screen (Male) on  07/10/18.           Tobacco Use Screen (Male) on  06/07/18.        Postponed            Colorectal Cancer Screen (Colonoscopy) (Male) until  03/26/19.        Canceled            Colorectal Cancer Screen (Sigmoidoscopy) (Male) on  03/26/19.        Review / Management            Impression and Plan   Diagnosis     Cyst of tendon sheath (XPQ93-HC M67.80).     Monoclonal gammopathy (SUX25-NT D47.2).     Hx pulmonary embolism (BTJ11-HJ Z86.711).     Condition:  ok for surgery asa2   will hold xarelto day before and resume day after procedure.

## 2022-02-16 NOTE — PROGRESS NOTES
Patient:   VIRGIL MATHEWS            MRN: [id number]            FIN: [id number]               Age:   47 years     Sex:  Male     :  1970   Associated Diagnoses:   Strain of flexor muscle of right hip   Author:   Ryder Coello MD      Chief Complaint   2018 1:35 PM CDT     c/o off and on right stomach pain. Getting worse last night. not feeling as hungry      History of Present Illness   see chief complaint as noted above and confirmed with the patient   47 year old male presenting with off and on abdominal pain. Pain is located in Right lower quadrant. Started a week ago and noticed it started getting a little more frequent and painful last night. The pain woke him up a couple times throughout the night.  He states the pain isn't horrible just more noticable. Sitting in a vehicle makes it worse. Denies any nausea or vomiting, changes in bowel movements, or urination. He's not as hungry today. Denies any recent injuries. No history of injuries or surgeries.      Review of Systems   Constitutional:  No fever, No chills, No weakness, No fatigue.    Ear/Nose/Mouth/Throat:  Negative.    Respiratory:  No shortness of breath, No cough.    Cardiovascular:  No chest pain.    Gastrointestinal:  No nausea, No vomiting     Abdominal pain: Right, Lower quadrant, The pain is mild, Characterized as ( Cramping/colicky ).    Genitourinary:  Negative.    Musculoskeletal:  No back pain.    Integumentary:  No rash.    Neurologic:  No headache.    Psychiatric:  Negative.              Health Status   Allergies:    Allergic Reactions (Selected)  Severity Not Documented  Cats (No reactions were documented)  Citalopram (Hives)   Medications:  (Selected)   Prescriptions  Prescribed  Lipitor 20 mg oral tablet: 1 tab(s) ( 20 mg ), PO, Daily, # 90 tab(s), 1 Refill(s), Type: Maintenance, Pharmacy: [facility], 1 tab(s) po daily  Xarelto 20 mg oral tablet: 1 tab(s) ( 20 mg ), po, qpm, # 90 tab(s), 1 Refill(s), Type:  Maintenance, Pharmacy: [facility], 1 tab(s) po qpm  cyclobenzaprine 10 mg oral tablet: See Instructions, Instructions: TAKE ONE TABLET BY MOUTH THREE TIMES A DAY AS NEEDED FOR PAIN, # 30 unknown unit, 1 Refill(s), Type: Soft Stop, Pharmacy: [facility]  econazole 1% topical cream: 1 modesto, TOP, BID, # 85 g, 5 Refill(s), Type: Maintenance, Pharmacy: [facility], 1 modesto top bid  ketorolac 0.5% ophthalmic solution: See Instructions, Instructions: USE 1 DROP IN BOTH EYES FOUR TIMES A DAY AS NEEDED FOR ITCHING, # 5 mL, 1 Refill(s), Type: Soft Stop, Pharmacy: [facility]  olopatadine 0.1% ophthalmic solution: 1 drop(s), both eyes, bid, PRN: for allergy symptoms, # 5 mL, 5 Refill(s), Type: Maintenance, Pharmacy: [facility], 1 drop(s) both eyes bid,PRN:for allergy symptoms  raNITIdine 300 mg oral tablet: 1 tab(s) ( 300 mg ), po, daily, # 90 tab(s), 1 Refill(s), Type: Maintenance, Pharmacy: [facility], 1 tab(s) po daily  traMADol 50 mg oral tablet: 1 tab(s) ( 50 mg ), PO, q4hr, PRN: for pain, # 60 tab(s), 1 Refill(s), Type: Maintenance, called to pharmacy (Rx)  venlafaxine 150 mg oral capsule, extended release: 1 cap(s) ( 150 mg ), po, daily, # 90 cap(s), 1 Refill(s), Type: Maintenance, Pharmacy: [facility], 1 cap(s) po daily  Documented Medications  Documented  Claritin 10 mg oral tablet: 1 tab(s) ( 10 mg ), po, daily, 0 Refill(s), Type: Maintenance  Flonase 50 mcg/inh nasal spray: 2 spray(s), nasal, daily, 0 Refill(s), Type: Maintenance  Multiple Vitamins oral tablet: 1 tab(s), PO, Daily, 0 Refill(s)  Tylenol: PO, 0 Refill(s)   Problem list:    All Problems  Snoring disorder / ICD-9-.09 / Confirmed  Seasonal allergies / SNOMED CT 388786894 / Confirmed  Multiple pulmonary emboli / SNOMED CT 12031603 / Confirmed  Bilateral pulmonary embolism / SNOMED CT 80925581 / Confirmed  Obese / SNOMED CT 4752851189 / Probable  Monoclonal gammopathy / SNOMED CT 301189975 / Confirmed  Low HDL  (under 40) / SNOMED CT 389662649 / Confirmed  HTN (hypertension) / SNOMED CT 3995934332 / Confirmed  GERD (gastroesophageal reflux disease) / SNOMED CT 587135486 / Confirmed  Dysthymia / SNOMED CT 599541136 / Confirmed  Back pain, chronic / SNOMED CT 626840903 / Confirmed  Carpal tunnel syndrome, left / SNOMED CT 52620665 / Confirmed  Inactive: Tinea pedis / SNOMED CT 33430454  Resolved: Irregular heartbeat / SNOMED CT 793229118  Resolved: Folliculitis / SNOMED CT 03183118  Resolved: *Hospitalized@[facility] - Bilateral pulmonary emboli      Histories   Past Medical History:    Active  Dysthymia (530491080): Onset on 7/22/2010 at 39 years.  Comments:  7/22/2010 CDT 1:18 PM CDT -     GERD (gastroesophageal reflux disease) (289601731)  Low HDL (under 40) (810547596)  HTN (hypertension) (1026512096)  Obese (4446528935)  Seasonal allergies (928317630)  Comments:  12/19/2012 CST 9:33 AM Peace Qureshi  Stable.  Back pain, chronic (809036358)  Comments:  12/19/2012 CST 9:54 AM Peace Qureshi  An MRI has been done previously, maybe in 2005.  Snoring disorder (786.09)  Comments:  12/19/2012 CST 9:40 AM Peace Qureshi  ENT evaluation by Dr. Jaleel Douglass on 05/07/2008.  A sleep study was performed; there was no evidence of apnea.    12/19/2012 CST 9:44 AM Peace Qureshi  This began after a severe pharyngitis.  Carpal tunnel syndrome, left (05897401)  Comments:  12/19/2012 CST 9:49 AM Peace Qureshi  Marcaine and Celestone injection done on 08/17/2007.  Resolved  *Hospitalized@[facility] - Bilateral pulmonary emboli: Onset on 5/15/2014 at 43 years.  Resolved on 5/16/2014 at 43 years.  Folliculitis (08262171): Onset in the month of 4/2008 at 37 years  Resolved.  Comments:  12/19/2012 CST 9:46 AM Peace Qureshi  Right thigh.  Irregular heartbeat (751511696): Onset on 4/17/2006 at 35 years.  Resolved.  Comments:  12/19/2012 CST 9:55 AM CST - Peace Ibarra  Holter monitor placed.   Family History:    Carpal Tunnel  Syndrome  Mother  Asthma  Grandfather (P)  Daughter (Jose)  Hypertension  Mother  Heart disease  Father  CA - Cancer  Mother  Comments:  1/12/2016 3:42 PM - Angeline Kong  skin  Hypercholesterolemia  Mother  Parkinson disease  Mother  HTN - Hypertension  Father  Back  Mother  Father  Prostate cancer..  Uncle  Comments:  12/19/2012 9:23 AM - Peace Ibarra  Prostate cancer in his 60s.  GERD - Gastro-esophageal reflux disease  Sister  ASHD - Atherosclerotic heart disease  Mother     Procedure history:    Cardiac computed tomography for calcium scoring (7842417846) on 1/24/2017 at 46 Years.  Comments:  2/2/2017 12:14 PM - Adriane Hayden  Total Agatston calcium score is 18  Vasectomy (78178902) on 2/20/2009 at 38 Years.  Polysomnogram (317510597) on 4/21/2008 at 37 Years.  Comments:  12/19/2012 10:00 AM - Peace Ibarra  Normal sleep architecture.  No evidence of obstructive sleep apnea.  Wrist injection (609950369) on 8/17/2007 at 36 Years.  Comments:  12/19/2012 9:50 AM - Peace Ibarra  Marcaine and Celestone injection, left wrist, for carpal tunnel symptoms.  Holter monitor (565752068) on 4/20/2006 at 35 Years.  Comments:  12/19/2012 10:03 AM - Peace Ibarra  Single 7-beat episode of asymptomatic slow atrial tachycardia with a rate of 102.  No other significant dysrhythmia.  Single episode of patient symptoms without dysrhythmia.  Cellulitis (5435882073) in 2005 at 35 Years.  Stress test ECG - treadmill (403644763) on 6/7/2005 at 34 Years.  Comments:  12/19/2012 10:06 AM - Peace Ibarra  Performed at [facility] for non-exertional chest discomfort.  Adenoidectomy (927016025).  Tonsillectomy (547420888).  Lymph node removal from axilla.   Social History:        Alcohol Assessment            Current, 1-2 times per week, 1 drinks/episode average.  2 drinks/episode maximum.      Tobacco Assessment            Never      Substance Abuse Assessment            Never      Employment and Education Assessment             Employed, Work/School description: .      Home and Environment Assessment            Marital status: .  Spouse/Partner name: Mera Caballero.  3 children.  Risks in environment: owns               secured gun.      Nutrition and Health Assessment            Type of diet: Regular.      Exercise and Physical Activity Assessment            Exercise frequency: 1-2 times/week.  Exercise type: Walking, elliptical.      Sexual Assessment            Sexually active: Yes.  Sexual orientation: Straight or heterosexual.  Contraceptive Use Details: vasectomy,               wife had hysterectomy.        Physical Examination   Vital Signs   6/7/2018 1:35 PM CDT Temperature Tympanic 97.4 DegF  LOW    Peripheral Pulse Rate 64 bpm    Systolic Blood Pressure 124 mmHg    Diastolic Blood Pressure 62 mmHg    Mean Arterial Pressure 83 mmHg      Measurements from flowsheet : Measurements   6/7/2018 1:35 PM CDT Height Measured - Standard 73 in    Weight Measured - Standard 231.6 lb    BSA 2.32 m2    Body Mass Index 30.55 kg/m2  HI      General:  Alert and oriented, No acute distress.    Eye:  Pupils are equal, round and reactive to light, Normal conjunctiva.    HENT:  Oral mucosa is moist.    Neck:  Supple.    Respiratory:  Respirations are non-labored.    Cardiovascular:  Normal rate, Regular rhythm, No edema.    Gastrointestinal:  Non-distended.    Musculoskeletal:  Normal gait.    Integumentary:  Warm, No rash.    Psychiatric:  Cooperative, Appropriate mood & affect, Normal judgment.       Review / Management   Results review      Impression and Plan   Diagnosis     Strain of flexor muscle of right hip ([id number]-GM S76.011A).     Plan:  Discussed that symptoms and exam are consistant with a right hip flexor strain. Discussed stretching, heat, and icing . ok to take tylenol.  Reviewed expected course, what to watch for, and when to return.   Lacie LONGORIA [facility], acted solely as a scribe for, and in the presence of  Dr. Ryder Coello who performed the service..

## 2022-02-16 NOTE — NURSING NOTE
Depression Screening Entered On:  1/22/2020 9:57 AM CST    Performed On:  1/21/2020 9:57 AM CST by Blanche Schroeder               Depression Screening   Little Interest - Pleasure in Activities :   Not at all   Feeling Down, Depressed, Hopeless :   Not at all   Initial Depression Screen Score :   0    Trouble Falling or Staying Asleep :   Not at all   Feeling Tired or Little Energy :   Not at all   Poor Appetite or Overeating :   Not at all   Feeling Bad About Yourself :   Not at all   Trouble Concentrating :   Not at all   Moving or Speaking Slowly :   Not at all   Thoughts Better Off Dead or Hurting Self :   Not at all   Detailed Depression Screen Score :   0    Total Depression Screen Score :   0    OPAL Difficulty with Work, Home, Others :   Not difficult at all   Blanche Schroeder - 1/22/2020 9:57 AM CST

## 2022-02-16 NOTE — PROGRESS NOTES
Patient:   VIRGIL MATHEWS            MRN: [id number]            FIN: [id number]               Age:   48 years     Sex:  Male     :  1970   Associated Diagnoses:   Cyst of tendon sheath   Author:   Silvio Burns MD      Visit Information      Date of Service: 2019 10:26 am  Performing Location: [facility]  Encounter#: [id number]      Primary Care Provider (PCP):  Silvio Burns MD    NPI# [id number]      Referring Provider:  Silvio Burns MD, NPI# [id number]      Chief Complaint   3/21/2019 10:29 AM CDT   Lump on finger of right hand.  Ongoing x6 weeks.        History of Present Illness   chief complaint and symptoms as noted above confirmed with patient   no pain or injury      Review of Systems   Constitutional:  Negative except as documented in history of present illness.    Musculoskeletal:  Negative except as documented in history of present illness.    Integumentary:  Negative except as documented in history of present illness.       Health Status   Allergies:    Allergic Reactions (Selected)  Severity Not Documented  Cats (No reactions were documented)  Citalopram (Hives)   Medications:  (Selected)   Prescriptions  Prescribed  Lipitor 20 mg oral tablet: = 1 tab(s) ( 20 mg ), PO, Daily, # 90 tab(s), 3 Refill(s), Type: Maintenance, Pharmacy: [facility], 1 tab(s) Oral daily  Xarelto 20 mg oral tablet: = 1 tab(s) ( 20 mg ), po, qpm, # 90 tab(s), 3 Refill(s), Type: Maintenance, Pharmacy: [facility], 1 tab(s) Oral qpm  cyclobenzaprine 10 mg oral tablet: See Instructions, Instructions: TAKE ONE TABLET BY MOUTH THREE TIMES A DAY AS NEEDED FOR PAIN, # 30 tab(s), 1 Refill(s), Type: Soft Stop, Pharmacy: [facility]  ketorolac 0.5% ophthalmic solution: 1 drop(s), Eye-Both, qid, PRN: as needed for itching, # 5 mL, 11 Refill(s), Type: Soft Stop, Pharmacy: [facility], 1 drop(s) Eye-Both qid,PRN:as needed for itching  olopatadine 0.1% ophthalmic solution: 1  drop(s), both eyes, bid, PRN: for allergy symptoms, # 5 mL, 11 Refill(s), Type: Maintenance, Pharmacy: [facility], 1 drop(s) Eye-Both bid,PRN:for allergy symptoms  raNITIdine 300 mg oral tablet: = 1 tab(s) ( 300 mg ), po, daily, # 90 tab(s), 3 Refill(s), Type: Maintenance, Pharmacy: [facility], 1 tab(s) Oral daily  traMADol 50 mg oral tablet: 1 tab(s) ( 50 mg ), PO, q4hr, PRN: for pain, # 60 tab(s), 1 Refill(s), Type: Maintenance, called to pharmacy (Rx)  venlafaxine 150 mg oral capsule, extended release: = 1 cap(s) ( 150 mg ), po, daily, # 90 cap(s), 3 Refill(s), Type: Maintenance, Pharmacy: [facility], 1 cap(s) Oral daily  Documented Medications  Documented  Claritin 10 mg oral tablet: 1 tab(s) ( 10 mg ), po, daily, 0 Refill(s), Type: Maintenance  Flonase 50 mcg/inh nasal spray: 2 spray(s), nasal, daily, 0 Refill(s), Type: Maintenance  Multiple Vitamins oral tablet: 1 tab(s), PO, Daily, 0 Refill(s)  Tylenol: PO, 0 Refill(s)  clotrimazole 1% topical cream: Topical, bid, Instructions: to replace Econazole 1% cream which is not covered by insurance., 0 Refill(s), Type: Maintenance,    Medications          *denotes recorded medication          *Tylenol: PO.          Lipitor 20 mg oral tablet: 20 mg, 1 tab(s), PO, Daily, 90 tab(s), 3 Refill(s).          *clotrimazole 1% topical cream: Topical, bid, to replace Econazole 1% cream which is not covered by insurance., 0 Refill(s).          cyclobenzaprine 10 mg oral tablet: See Instructions, TAKE ONE TABLET BY MOUTH THREE TIMES A DAY AS NEEDED FOR PAIN, 30 tab(s), 1 Refill(s).          *Flonase 50 mcg/inh nasal spray: 2 spray(s), nasal, daily, 0 Refill(s).          ketorolac 0.5% ophthalmic solution: 1 drop(s), Eye-Both, qid, PRN: as needed for itching, 5 mL, 11 Refill(s).          *Claritin 10 mg oral tablet: 10 mg, 1 tab(s), po, daily.          *Multiple Vitamins oral tablet: 1 tab(s), PO, Daily.          olopatadine 0.1% ophthalmic solution: 1 drop(s),  both eyes, bid, PRN: for allergy symptoms, 5 mL, 11 Refill(s).          raNITIdine 300 mg oral tablet: 300 mg, 1 tab(s), po, daily, 90 tab(s), 3 Refill(s).          Xarelto 20 mg oral tablet: 20 mg, 1 tab(s), po, qpm, 90 tab(s), 3 Refill(s).          traMADol 50 mg oral tablet: 50 mg, 1 tab(s), PO, q4hr, PRN: for pain, 60 tab(s), 1 Refill(s).          venlafaxine 150 mg oral capsule, extended release: 150 mg, 1 cap(s), po, daily, 90 cap(s), 3 Refill(s).     Problem list:    All Problems (Selected)  Carpal tunnel syndrome, left / SNOMED CT 30580816 / Confirmed  Back pain, chronic / SNOMED CT 814071604 / Confirmed  Dysthymia / SNOMED CT 797968514 / Confirmed  GERD (gastroesophageal reflux disease) / SNOMED CT 341319592 / Confirmed  Hx pulmonary embolism / SNOMED CT 576001566 / Confirmed  Low HDL (under 40) / SNOMED CT 118681842 / Confirmed  Monoclonal gammopathy / SNOMED CT 035746492 / Confirmed  Obese / SNOMED CT 2324558055 / Probable  Seasonal allergies / SNOMED CT 583509652 / Confirmed  Snoring disorder / ICD-9-.09 / Confirmed      Histories   Past Medical History:    Active  Dysthymia (604492381): Onset on 7/22/2010 at 39 years.  Comments:  7/22/2010 CDT 1:18 PM CDT -     GERD (gastroesophageal reflux disease) (304485293)  Low HDL (under 40) (817005843)  Obese (2059124411)  Seasonal allergies (390178317)  Comments:  12/19/2012 CST 9:33 AM Peace Qureshi  Stable.  Back pain, chronic (207561580)  Comments:  12/19/2012 CST 9:54 AM Peace Qureshi  An MRI has been done previously, maybe in 2005.  Snoring disorder (786.09)  Comments:  12/19/2012 CST 9:40 AM Peace Qureshi  ENT evaluation by Dr. Jaleel Douglass on 05/07/2008.  A sleep study was performed; there was no evidence of apnea.    12/19/2012 CST 9:44 AM Peace Qureshi  This began after a severe pharyngitis.  Carpal tunnel syndrome, left (69191152)  Comments:  12/19/2012 CST 9:49 AM Peace Qureshi  Marcaine and Celestone injection done on  08/17/2007.  Resolved  *Hospitalized@[facility] - Bilateral pulmonary emboli: Onset on 5/15/2014 at 43 years.  Resolved on 5/16/2014 at 43 years.  Folliculitis (31738544): Onset in the month of 4/2008 at 37 years  Resolved.  Comments:  12/19/2012 CST 9:46 AM Peace Qureshi  Right thigh.  Irregular heartbeat (295765806): Onset on 4/17/2006 at 35 years.  Resolved.  Comments:  12/19/2012 CST 9:55 AM Peace Qureshi  Holter monitor placed.  Multiple pulmonary emboli (80032403):  Resolved.  Bilateral pulmonary embolism (57777743):  Resolved.   Family History:    Carpal Tunnel Syndrome  Mother  Asthma  Grandfather (P)  Daughter (Xiola)  Hypertension  Mother  Heart disease  Father  CA - Cancer  Mother  Comments:  1/12/2016 3:42 PM Angeline Dawson  skin  Hypercholesterolemia  Mother  Parkinson disease  Mother  HTN - Hypertension  Father  Back  Mother  Father  Prostate cancer..  Uncle  Comments:  12/19/2012 9:23 AM Peace Qureshi  Prostate cancer in his 60s.  GERD - Gastro-esophageal reflux disease  Sister  ASHD - Atherosclerotic heart disease  Mother     Procedure history:    Cardiac computed tomography for calcium scoring (SNOMED CT 0340693689) on 1/24/2017 at 46 Years.  Comments:  2/2/2017 12:14 PM dAriane Ayala  Total Agatston calcium score is 18  Vasectomy (SNOMED CT 11831057) on 2/20/2009 at 38 Years.  Polysomnogram (SNOMED CT 136636661) on 4/21/2008 at 37 Years.  Comments:  12/19/2012 10:00 AM Peace Qureshi  Normal sleep architecture.  No evidence of obstructive sleep apnea.  Wrist injection (SNOMED CT 299799437) on 8/17/2007 at 36 Years.  Comments:  12/19/2012 9:50 AM Peace Qureshi  Marcaine and Celestone injection, left wrist, for carpal tunnel symptoms.  Holter monitor (SNOMED CT 340840540) performed by Britton Pepe MD on 4/20/2006 at 35 Years.  Comments:  12/19/2012 10:03 AM Peace Qureshi  Single 7-beat episode of asymptomatic slow atrial tachycardia with a rate of 102.  No other  significant dysrhythmia.  Single episode of patient symptoms without dysrhythmia.  Cellulitis (SNOMED CT 8737756085) in 2005 at 35 Years.  Stress test ECG - treadmill (SNOMED CT 846544986) on 6/7/2005 at 34 Years.  Comments:  12/19/2012 10:06 AM CST - Peace Ibarra  Performed at [facility] for non-exertional chest discomfort.  Adenoidectomy (SNOMED CT 836894944).  Tonsillectomy (SNOMED CT 822274344).  Lymph node removal from axilla.   Social History:        Alcohol Assessment            Current, 2-4 TIMES PER WEEK, 1 drinks/episode average.      Tobacco Assessment            Never      Substance Abuse Assessment            Never      Employment and Education Assessment            Employed, Work/School description: .      Home and Environment Assessment            Marital status: .  Spouse/Partner name: Mera Caballero.  3 children.  Risks in environment: owns               secured gun.      Nutrition and Health Assessment            Type of diet: Regular.      Exercise and Physical Activity Assessment            Exercise frequency: 1-2 times/week.  Exercise type: Walking, elliptical.      Sexual Assessment            Sexually active: Yes.  Sexual orientation: Straight or heterosexual.  Contraceptive Use Details: vasectomy,               wife had hysterectomy.      Physical Examination   Vital Signs   3/21/2019 10:29 AM CDT Temperature Tympanic 97.6 DegF  LOW    Peripheral Pulse Rate 72 bpm    HR Method Electronic    Systolic Blood Pressure 113 mmHg    Diastolic Blood Pressure 73 mmHg    Mean Arterial Pressure 86 mmHg    BP Method Electronic      Measurements from flowsheet : Measurements   3/21/2019 10:29 AM CDT   Weight Measured - Standard                249.8 lb     General:  Alert and oriented, No acute distress.    Musculoskeletal:  tendon sheath cyst prox r 3rd finger laterally  cms otherwise intact.    Neurologic:  Alert, Oriented.       Impression and Plan   Diagnosis     Cyst of  tendon sheath (UUU14-EV M67.80).     Course:  Progressing as expected.    Plan:  ortho if gets bigger or bothersome.    Patient Instructions:       Counseled: Patient, Regarding diagnosis, Regarding treatment, Activity.

## 2022-02-16 NOTE — TELEPHONE ENCOUNTER
---------------------  From: Silvio Burns MD   To: VIRGIL MATHEWS    Sent: 1/13/2021 8:27:34 AM CST  Subject: General Message     All labs acceptable.  Please let us know you received this message by either selecting Forward or Reply at the top.  Thank you      Results:  Date Result Name Ind Value Ref Range   1/12/2021 8:53 AM Potassium Level  4.4 mmol/L (3.5 - 5.3)   1/12/2021 8:53 AM Glucose Level ((H)) 103 mg/dL (65 - 99)   1/12/2021 8:53 AM Creatinine Level  1.00 mg/dL (0.70 - 1.33)   1/12/2021 8:53 AM Hgb A1c  5.3 ( - <5.7)   1/12/2021 8:53 AM Cholesterol  147 mg/dL ( - <200)   1/12/2021 8:53 AM HDL ((L)) 36 mg/dL (> OR = 40 - )   1/12/2021 8:53 AM LDL  88    1/12/2021 8:53 AM Triglyceride  133 mg/dL ( - <150)

## 2022-02-16 NOTE — TELEPHONE ENCOUNTER
---------------------  From: Silvio Burns MD   To: VIRGIL MATHEWS    Sent: 1/4/2019 7:52:32 AM CST    All labs acceptable.  Please let us know you received this message by either selecting Forward or Reply at the top.  Thank you    Results:  Date Result Name Ind Value Ref Range   1/3/2019 8:32 AM Potassium Level  4.4 mmol/L (3.5 - 5.3)   1/3/2019 8:32 AM Glucose Level  99 mg/dL (65 - 99)   1/3/2019 8:32 AM Creatinine Level  0.96 mg/dL (0.70 - 1.33)   1/3/2019 8:32 AM Hgb A1c  5.2 ( - <5.7)   1/3/2019 8:32 AM Cholesterol  152 mg/dL ( - <200)   1/3/2019 8:32 AM HDL ((L)) 36 mg/dL (>40 - )   1/3/2019 8:32 AM LDL  93    1/3/2019 8:32 AM Triglyceride  132 mg/dL ( - <150)

## 2022-02-16 NOTE — TELEPHONE ENCOUNTER
---------------------  From: Aubrie Teague CMA   To: [facility] Message Pool ([id number]);     Sent: 12/11/2019 8:54:19 AM CST  Subject: Ranitidine alternative     Received a fax from [facility] pharmacy asking for an alternative on the Ranitidine as it is recalled. This is only a documented medication. Last OV 9/24/19 [place] w/ TFS. Please advise.---------------------  From: Shruthi Galaviz CMA ([facility] Message Pool ([id number]_[facility]))   To: Silvio Burns MD;     Sent: 12/11/2019 8:55:41 AM CST  Subject: FW: Ranitidine alternative---------------------  From: Silvio Burns MD   To: [facility] Message Pool ([id number] [facility] [place]);     Sent: 12/11/2019 9:50:23 AM CST  Subject: RE: Ranitidine alternative     pepcid 40mg daily dsp 90 ref 3rx sent to [facility] pharmacy per TFS.

## 2022-02-16 NOTE — TELEPHONE ENCOUNTER
Entered by Shruthi Galaviz CMA on January 12, 2021 8:42:17 AM CST  ---------------------  From: Shruthi Galaviz CMA   To: [facility]    Sent: 1/12/2021 8:42:17 AM CST  Subject: Medication Management     ** Submitted: **  Order:venlafaxine (venlafaxine 150 mg oral capsule, extended release)  1 cap(s)  Oral  daily  Qty:  30 cap(s)        Duration:  30 day(s)        Refills:  0          Substitutions Allowed     Route To [facility]    Signed by Shruthi Galaviz CMA  1/12/2021 2:41:00 PM UT    ** Submitted: **  Complete:venlafaxine (venlafaxine 150 mg oral capsule, extended release)   Signed by Shruthi Galaviz CMA  1/12/2021 2:42:00 PM UT    ** Not Approved:  **  venlafaxine (VENLAFAXINE HCL ER 150MG CP24)  TAKE ONE CAPSULE BY MOUTH EVERY DAY  Qty:  90 unknown unit        Days Supply:  90        Refills:  3          Substitutions Allowed     Route To [facility]   Signed by Shruthi Galaviz CMA            ** Submitted: **  Order:rivaroxaban (Xarelto 20 mg oral tablet)  1 tab(s)  Oral  qpm  Qty:  30 tab(s)        Duration:  30 day(s)        Refills:  0          Substitutions Allowed     Route To [facility]    Signed by Shruthi Galaviz CMA  1/12/2021 2:41:00 PM UT    ** Submitted: **  Complete:rivaroxaban (Xarelto 20 mg oral tablet)   Signed by Shruthi Galaviz CMA  1/12/2021 2:41:00 PM UT    ** Not Approved:  **  rivaroxaban (XARELTO 20MG TABS)  TAKE ONE TABLET BY MOUTH EVERY EVENING  Qty:  90 unknown unit        Days Supply:  90        Refills:  3          Substitutions Allowed     Route To [facility]   Signed by Shruthi Galaviz CMA            ------------------------------------------  From: [facility]  To: Silvio Burns MD  Sent: January 12, 2021 8:39:55 AM CST  Subject: Medication Management  Due: January 12, 2021 10:11:26 AM CST     ** On Hold Pending Signature **     Drug: venlafaxine (venlafaxine 150 mg oral capsule, extended release), TAKE ONE  CAPSULE BY MOUTH EVERY DAY  Quantity: 90 unknown unit  Days Supply: 90  Refills: 2  Substitutions Allowed  Notes from Pharmacy:     Dispensed Drug: venlafaxine (venlafaxine 150 mg oral capsule, extended release), TAKE ONE CAPSULE BY MOUTH EVERY DAY  Quantity: 90 unknown unit  Days Supply: 90  Refills: 3  Substitutions Allowed  Notes from Pharmacy:     ** On Hold Pending Signature **     Drug: rivaroxaban (Xarelto 20 mg oral tablet), TAKE ONE TABLET BY MOUTH EVERY EVENING  Quantity: 90 unknown unit  Days Supply: 90  Refills: 2  Substitutions Allowed  Notes from Pharmacy:     Dispensed Drug: rivaroxaban (Xarelto 20 mg oral tablet), TAKE ONE TABLET BY MOUTH EVERY EVENING  Quantity: 90 unknown unit  Days Supply: 90  Refills: 3  Substitutions Allowed  Notes from Pharmacy:  ------------------------------------------LR 1/2020 x 1 yr. Last px in Jan 2020. Due for a px at the end of this month ( in recall). Refilled x 30 days.

## 2022-02-16 NOTE — PROGRESS NOTES
Patient:   VIRGIL MATHEWS            MRN: [id number]            FIN: [id number]               Age:   48 years     Sex:  Male     :  1970   Associated Diagnoses:   Annual exam; Obese; Hypercholesteremia; Dysthymia; Bilateral pulmonary embolism; Monoclonal gammopathy   Author:   Silvio Burns MD      Visit Information      Date of Service: 2019 09:42 am  Performing Location: [facility]  Encounter#: [id number]      Primary Care Provider (PCP):  Silvio Burns MD# [id number]      Referring Provider:  Silvio Burns MD# [id number]   Visit type:  Annual exam.    Accompanied by:  No one.    Source of history:  Self.    History limitation:  None.       Chief Complaint   2019 9:47 AM CST    Px        Well Adult History   Well Adult History             The patient presents for well adult exam, fu htn and pe.  The patient's general health status is described as good.  The patient's diet is described as balanced.  Exercise: routine, aerobic activity.  Associated symptoms consist of weight loss.  Complaint: Taking medications as directed   long term xarelto for recurrent pe   Seeing heme for  monoclonal jm  bp good no longer htn with wt loss   .  Additional pertinent history: occasional caffeine use, tobacco use none and alcohol use socially.  Notes.            Review of Systems   Constitutional:  No fever, No chills, No sweats, No weakness, No fatigue.    Eye:  No recent visual problem.    Ear/Nose/Mouth/Throat:  No sore throat.    Respiratory:  No shortness of breath, No cough, No sputum production.    Cardiovascular:  No chest pain, No peripheral edema.    Gastrointestinal:  Negative except as documented in history of present illness, No nausea, No vomiting, No diarrhea, No constipation.    Genitourinary:  No dysuria, No hematuria.    Musculoskeletal:  Negative except as documented in history of present illness, No back pain, No muscle pain.    Integumentary:   Negative except as documented in history of present illness, No rash.    Neurologic:  Alert and oriented X4.       Health Status   Allergies:    Allergic Reactions (Selected)  Severity Not Documented  Cats (No reactions were documented)  Citalopram (Hives)   Medications:  (Selected)   Prescriptions  Prescribed  Lipitor 20 mg oral tablet: 1 tab(s) ( 20 mg ), PO, Daily, # 90 tab(s), 1 Refill(s), Type: Maintenance, Pharmacy: [facility], 1 tab(s) Oral daily  Xarelto 20 mg oral tablet: 1 tab(s) ( 20 mg ), po, qpm, # 90 tab(s), 1 Refill(s), Type: Maintenance, Pharmacy: [facility], 1 tab(s) Oral qpm  cyclobenzaprine 10 mg oral tablet: See Instructions, Instructions: TAKE ONE TABLET BY MOUTH THREE TIMES A DAY AS NEEDED FOR PAIN, # 30 tab(s), 1 Refill(s), Type: Soft Stop, Pharmacy: [facility]  ketorolac 0.5% ophthalmic solution: See Instructions, Instructions: USE 1 DROP IN BOTH EYES FOUR TIMES A DAY AS NEEDED FOR ITCHING, # 5 mL, 1 Refill(s), Type: Soft Stop, Pharmacy: [facility], USE 1 DROP IN BOTH EYES FOUR TIMES A DAY AS NEEDED FOR ITCHING  olopatadine 0.1% ophthalmic solution: 1 drop(s), both eyes, bid, PRN: for allergy symptoms, # 5 mL, 5 Refill(s), Type: Maintenance, Pharmacy: [facility], 1 drop(s) Eye-Both bid,PRN:for allergy symptoms  raNITIdine 300 mg oral tablet: 1 tab(s) ( 300 mg ), po, daily, # 90 tab(s), 1 Refill(s), Type: Maintenance, Pharmacy: [facility], 1 tab(s) Oral daily  traMADol 50 mg oral tablet: 1 tab(s) ( 50 mg ), PO, q4hr, PRN: for pain, # 60 tab(s), 1 Refill(s), Type: Maintenance, called to pharmacy (Rx)  venlafaxine 150 mg oral capsule, extended release: 1 cap(s) ( 150 mg ), po, daily, # 90 cap(s), 1 Refill(s), Type: Maintenance, Pharmacy: [facility], 1 cap(s) Oral daily  Documented Medications  Documented  Claritin 10 mg oral tablet: 1 tab(s) ( 10 mg ), po, daily, 0 Refill(s), Type: Maintenance  Flonase 50 mcg/inh nasal spray: 2 spray(s), nasal, daily, 0  Refill(s), Type: Maintenance  Multiple Vitamins oral tablet: 1 tab(s), PO, Daily, 0 Refill(s)  Tylenol: PO, 0 Refill(s)  clotrimazole 1% topical cream: Topical, bid, Instructions: to replace Econazole 1% cream which is not covered by insurance., 0 Refill(s), Type: Maintenance,    Medications          *denotes recorded medication          *Tylenol: PO.          Lipitor 20 mg oral tablet: 20 mg, 1 tab(s), PO, Daily, 90 tab(s), 1 Refill(s).          *clotrimazole 1% topical cream: Topical, bid, to replace Econazole 1% cream which is not covered by insurance., 0 Refill(s).          cyclobenzaprine 10 mg oral tablet: See Instructions, TAKE ONE TABLET BY MOUTH THREE TIMES A DAY AS NEEDED FOR PAIN, 30 tab(s), 1 Refill(s).          *Flonase 50 mcg/inh nasal spray: 2 spray(s), nasal, daily, 0 Refill(s).          ketorolac 0.5% ophthalmic solution: See Instructions, USE 1 DROP IN BOTH EYES FOUR TIMES A DAY AS NEEDED FOR ITCHING, 5 mL, 1 Refill(s).          *Claritin 10 mg oral tablet: 10 mg, 1 tab(s), po, daily.          *Multiple Vitamins oral tablet: 1 tab(s), PO, Daily.          olopatadine 0.1% ophthalmic solution: 1 drop(s), both eyes, bid, PRN: for allergy symptoms, 5 mL, 5 Refill(s).          raNITIdine 300 mg oral tablet: 300 mg, 1 tab(s), po, daily, 90 tab(s), 1 Refill(s).          Xarelto 20 mg oral tablet: 20 mg, 1 tab(s), po, qpm, 90 tab(s), 1 Refill(s).          traMADol 50 mg oral tablet: 50 mg, 1 tab(s), PO, q4hr, PRN: for pain, 60 tab(s), 1 Refill(s).          venlafaxine 150 mg oral capsule, extended release: 150 mg, 1 cap(s), po, daily, 90 cap(s), 1 Refill(s).     Problem list:    All Problems  Carpal tunnel syndrome, left / SNOMED CT 44129948 / Confirmed  Back pain, chronic / SNOMED CT 403758621 / Confirmed  Dysthymia / SNOMED CT 469786976 / Confirmed  GERD (gastroesophageal reflux disease) / SNOMED CT 231341020 / Confirmed  Low HDL (under 40) / SNOMED CT 341049272 / Confirmed  Monoclonal gammopathy /  SNOMED CT 970274732 / Confirmed  Obese / SNOMED CT 9867125981 / Probable  Multiple pulmonary emboli / SNOMED CT 19383443 / Confirmed  Bilateral pulmonary embolism / SNOMED CT 49163133 / Confirmed  Seasonal allergies / SNOMED CT 365000359 / Confirmed  Snoring disorder / ICD-9-.09 / Confirmed  Inactive: Tinea pedis / SNOMED CT 34144573  Resolved: *Hospitalized@[facility] - Bilateral pulmonary emboli  Resolved: Folliculitis / SNOMED CT 20591825  Resolved: Irregular heartbeat / SNOMED CT 945411381  Canceled: HTN (hypertension) / SNOMED CT 7312731051      Histories   Past Medical History:    Active  Dysthymia (605744694): Onset on 7/22/2010 at 39 years.  Comments:  7/22/2010 CDT 1:18 PM CDT -     GERD (gastroesophageal reflux disease) (239870219)  Low HDL (under 40) (090542395)  Obese (9469952985)  Seasonal allergies (393050610)  Comments:  12/19/2012 CST 9:33 AM Peace Qureshi  Stable.  Back pain, chronic (182279378)  Comments:  12/19/2012 CST 9:54 AM Peace Qureshi  An MRI has been done previously, maybe in 2005.  Snoring disorder (786.09)  Comments:  12/19/2012 CST 9:40 AM Peace Qureshi  ENT evaluation by Dr. Jaleel Douglass on 05/07/2008.  A sleep study was performed; there was no evidence of apnea.    12/19/2012 CST 9:44 AM Peace Qureshi  This began after a severe pharyngitis.  Carpal tunnel syndrome, left (11898616)  Comments:  12/19/2012 CST 9:49 AM Peace Qureshi  Marcaine and Celestone injection done on 08/17/2007.  Resolved  *Hospitalized@[facility] - Bilateral pulmonary emboli: Onset on 5/15/2014 at 43 years.  Resolved on 5/16/2014 at 43 years.  Folliculitis (56926267): Onset in the month of 4/2008 at 37 years  Resolved.  Comments:  12/19/2012 CST 9:46 AM Peace Qureshi  Right thigh.  Irregular heartbeat (717849046): Onset on 4/17/2006 at 35 years.  Resolved.  Comments:  12/19/2012 CST 9:55 AM CST - Peace Ibarra  Holter monitor placed.   Family History:    Carpal Tunnel  Syndrome  Mother  Asthma  Grandfather (P)  Daughter (Jose)  Hypertension  Mother  Heart disease  Father  CA - Cancer  Mother  Comments:  1/12/2016 3:42 PM ANIYAH Kong Angeline  skin  Hypercholesterolemia  Mother  Parkinson disease  Mother  HTN - Hypertension  Father  Back  Mother  Father  Prostate cancer..  Uncle  Comments:  12/19/2012 9:23 AM Peace Qureshi  Prostate cancer in his 60s.  GERD - Gastro-esophageal reflux disease  Sister  ASHD - Atherosclerotic heart disease  Mother     Procedure history:    Cardiac computed tomography for calcium scoring (SNOMED CT 2534036745) on 1/24/2017 at 46 Years.  Comments:  2/2/2017 12:14 PM Adriane Ayala  Total Agatston calcium score is 18  Vasectomy (SNOMED CT 89361507) on 2/20/2009 at 38 Years.  Polysomnogram (SNOMED CT 603364244) on 4/21/2008 at 37 Years.  Comments:  12/19/2012 10:00 AM Peace Qureshi  Normal sleep architecture.  No evidence of obstructive sleep apnea.  Wrist injection (SNOMED CT 751005487) on 8/17/2007 at 36 Years.  Comments:  12/19/2012 9:50 AM Peace Qureshi  Marcaine and Celestone injection, left wrist, for carpal tunnel symptoms.  Holter monitor (SNOMED CT 579451253) performed by Britton Pepe MD on 4/20/2006 at 35 Years.  Comments:  12/19/2012 10:03 AM Peace Qureshi  Single 7-beat episode of asymptomatic slow atrial tachycardia with a rate of 102.  No other significant dysrhythmia.  Single episode of patient symptoms without dysrhythmia.  Cellulitis (SNOMED CT 1183361506) in 2005 at 35 Years.  Stress test ECG - treadmill (SNOMED CT 395258522) on 6/7/2005 at 34 Years.  Comments:  12/19/2012 10:06 AM Peace Qureshi  Performed at [facility] for non-exertional chest discomfort.  Adenoidectomy (SNOMED CT 128802626).  Tonsillectomy (SNOMED CT 202365408).  Lymph node removal from axilla.   Social History:        Alcohol Assessment            Current, 1-2 times per week, 1 drinks/episode average.  2 drinks/episode  maximum.      Tobacco Assessment            Never      Substance Abuse Assessment            Never      Employment and Education Assessment            Employed, Work/School description: .      Home and Environment Assessment            Marital status: .  Spouse/Partner name: Mera Caballero.  3 children.  Risks in environment: owns               secured gun.      Nutrition and Health Assessment            Type of diet: Regular.      Exercise and Physical Activity Assessment            Exercise frequency: 1-2 times/week.  Exercise type: Walking, elliptical.      Sexual Assessment            Sexually active: Yes.  Sexual orientation: Straight or heterosexual.  Contraceptive Use Details: vasectomy,               wife had hysterectomy.      Physical Examination   Vital Signs   1/17/2019 9:47 AM CST Temperature Tympanic 97.4 DegF  LOW    Peripheral Pulse Rate 67 bpm    HR Method Electronic    Systolic Blood Pressure 109 mmHg    Diastolic Blood Pressure 71 mmHg    Mean Arterial Pressure 84 mmHg    BP Method Electronic      Measurements from flowsheet : Measurements   1/17/2019 9:47 AM CST Height Measured - Standard 73 in    Weight Measured - Standard 246.0 lb    BSA 2.39 m2    Body Mass Index 32.45 kg/m2  HI      General:  Alert and oriented, No acute distress.    Eye:  Pupils are equal, round and reactive to light, Extraocular movements are intact.    HENT:  Normocephalic, Tympanic membranes are clear, Normal hearing, Oral mucosa is moist.    Neck:  Supple, Non-tender, No carotid bruit, No jugular venous distention, No lymphadenopathy, No thyromegaly.    Respiratory:  Lungs are clear to auscultation, Respirations are non-labored, Breath sounds are equal.    Cardiovascular:  Normal rate, Regular rhythm, No murmur, Good pulses equal in all extremities, No edema.    Gastrointestinal:  Soft, Non-distended, Normal bowel sounds, No organomegaly, Epigastric tenderness.    Lymphatics:  No lymphadenopathy  neck, axilla, groin.    Musculoskeletal:  Normal range of motion, Normal strength, No tenderness, No swelling, degenerative changes noted.    Integumentary:  Warm, Dry, tinea pedis.    Neurologic:  Alert, Oriented, Normal sensory, Normal motor function, No focal deficits, Cranial Nerves II-XII are grossly intact, Normal deep tendon reflexes.    Psychiatric:  Cooperative, Appropriate mood & affect, Normal judgment.       Review / Management   Results review      Impression and Plan   Diagnosis     Annual exam (ZQV76-NW Z00.00).     Obese (ZWZ02-JI E66.9).     Hypercholesteremia (HFO19-HX E78.00).     Dysthymia (RYU60-RE F34.1).     Bilateral pulmonary embolism (JOE79-OU I26.99).     Monoclonal gammopathy (JKM20-LZ D47.2).     Course:  Progressing as expected.    Plan:  BMI,Weight loss,exercise,diet discussed, fu heme  bleeding risk reviewed  skin care reviewed  fu 1 year.         Follow-up: With Primary Care Provider, Return to clinic, In 12 months.    Patient Instructions:       Counseled: Patient, Regarding diagnosis, Regarding medications, Verbalized understanding.

## 2022-02-16 NOTE — PROGRESS NOTES
Patient:   JUAN MATHEWS            MRN: [id number]            FIN: [id number]               Age:   49 years     Sex:  Male     :  1970   Associated Diagnoses:   Subacromial bursitis of right shoulder joint   Author:   Juan Garcia MD      Impression and Plan   Diagnosis     Subacromial bursitis of right shoulder joint (CVE12-OD M75.51).     Orders     Orders (Selected)   Outpatient Orders  Order   arthrocentesis aspir+/injection major jt/bursa (Charge): Quantity: 1, Subacromial bursitis  Kenalog-40: 40 mg, IM, once.     Counseled:  Patient, Regarding diagnosis, Regarding treatment, Regarding medications.       Procedure   Joint aspiration/ injection procedure   Date/ Time:  2020 10:48:00 AM.     Confirmed: patient, procedure, side, site, safety procedures followed.     Performed by: Juan Garcia MD.     Informed consent: verbal consent by patient.     Indication: symptomatic relief.     Location: the right shoulder.     Preparation and technique: skin prep (alcohol, povidone iodine (Betadine)), sterile preparation of site (in usual fashion, with 70 % alcohol), sterile needle used (size #25 gauge, length 1.5 inch, Posterior approach).     Joint injected: with  Triamcinolone (40  mg, 1.0  ml).     Procedure tolerated: well.

## 2022-02-16 NOTE — NURSING NOTE
Comprehensive Intake Entered On:  2019 10:33 AM CST    Performed On:  2019 10:31 AM CST by Charlotte Villalba               Summary   Chief Complaint :   f/up skin lesion   Weight Measured :   258.8 lb(Converted to: 258 lb 13 oz, 117.39 kg)    Height Measured :   73 in(Converted to: 6 ft 1 in, 185.42 cm)    Body Mass Index :   34.14 kg/m2 (HI)    Body Surface Area :   2.46 m2   Systolic Blood Pressure :   114 mmHg   Diastolic Blood Pressure :   74 mmHg   Mean Arterial Pressure :   87 mmHg   Peripheral Pulse Rate :   72 bpm   BP Method :   Electronic   HR Method :   Electronic   Temperature Tympanic :   97.3 DegF(Converted to: 36.3 DegC)  (LOW)    Charlotte Villalba - 2019 10:31 AM CST   Demographics   Last Name :   Ada   Address :   [address] [place] [address]   First Name :   Juan Guadarrama Initial :   L   Responsible Party Date of Birth () :   1970 CST   City :   [place]   State :   [place]   Zip Code :   [id number]   Charlotte Villalba - 2019 10:31 AM CST   Providers Grid   Provider Name :    [place] [facility]   [facility] (PT)     Provider Specialty :    Dentist   Ophthamology             Charlotte Villalba - 2019 10:31 AM CST  Charlotte Villalba - 2019 10:31 AM CST  Charlotte Villalba - 2019 10:31 AM CST  Charlotte Villalba - 2019 10:31 AM CST      Provider Name :    [facility]   Dr. Milo Fofana     Provider Specialty :          Podiatry   Neurology       Charlotte Villalba - 2019 10:31 AM CST  Charlotte Villalba - 2019 10:31 AM CST  Charlotte Villalba - 2019 10:31 AM CST  Charlotte Villalba - 2019 10:31 AM CST

## 2022-02-16 NOTE — TELEPHONE ENCOUNTER
---------------------  From: Charlotte Villalba   Sent: 1/28/2020 10:20:57 AM CST  Subject: Result: Tissue Pathology     Spoke with patient at 10:20am regarding results.  Per TFS, pathology of recent skin biopsy was benign

## 2022-02-16 NOTE — NURSING NOTE
CAGE Assessment Entered On:  1/22/2020 9:57 AM CST    Performed On:  1/21/2020 9:57 AM CST by Blanche Schroeder               Assessment   Have you ever felt you should cut down on your drinking :   No   Have people annoyed you by criticizing your drinking :   No   Have you ever felt bad or guilty about your drinking :   No   Have you ever taken a drink first thing in the morning to steady your nerves or get rid of a hangover (Eye-opener) :   No   CAGE Score :   0    Blanche Schroeder - 1/22/2020 9:57 AM CST

## 2022-02-16 NOTE — NURSING NOTE
Comprehensive Intake Entered On:  9/24/2019 9:52 AM CDT    Performed On:  9/24/2019 9:50 AM CDT by Charlotte Villalba               Summary   Chief Complaint :   Skin concern   Weight Measured :   254.2 lb(Converted to: 254 lb 3 oz, 115.30 kg)    Height Measured :   73 in(Converted to: 6 ft 1 in, 185.42 cm)    Body Mass Index :   33.53 kg/m2 (HI)    Body Surface Area :   2.43 m2   Systolic Blood Pressure :   109 mmHg   Diastolic Blood Pressure :   73 mmHg   Mean Arterial Pressure :   85 mmHg   Peripheral Pulse Rate :   71 bpm   BP Method :   Electronic   HR Method :   Electronic   Temperature Tympanic :   97.2 DegF(Converted to: 36.2 DegC)  (LOW)    Charlotte Villalba - 9/24/2019 9:50 AM CDT   Health Status   Allergies Verified? :   Yes   Medication History Verified? :   Yes   Pre-Visit Planning Status :   Completed   Tobacco Use? :   Never smoker   Charlotte Villalba - 9/24/2019 9:50 AM CDT

## 2022-02-16 NOTE — PROGRESS NOTES
Patient:   VIRGIL MATHEWS            MRN: [id number]            FIN: [id number]               Age:   48 years     Sex:  Male     :  1970   Associated Diagnoses:   Cyst of tendon sheath   Author:   Silvio Burns MD      Visit Information      Date of Service: 2019 10:24 am  Performing Location: [facility]  Encounter#: [id number]      Primary Care Provider (PCP):  Silvio Burns MD    NPI# [id number]      Referring Provider:  Silvio Burns MD# [id number]      Chief Complaint   2019 10:31 AM CDT   f/up cyst on right hand/finger.  Red spot on forehead.        History of Present Illness   chief complaint and symptoms as noted above confirmed with patient   no pain or injury      Review of Systems   Constitutional:  Negative except as documented in history of present illness.    Musculoskeletal:  Negative except as documented in history of present illness.    Integumentary:  Negative except as documented in history of present illness.       Health Status   Allergies:    Allergic Reactions (Selected)  Severity Not Documented  Cats (No reactions were documented)  Citalopram (Hives)   Medications:  (Selected)   Prescriptions  Prescribed  Lipitor 20 mg oral tablet: = 1 tab(s) ( 20 mg ), PO, Daily, # 90 tab(s), 3 Refill(s), Type: Maintenance, Pharmacy: [facility], 1 tab(s) Oral daily  Xarelto 20 mg oral tablet: = 1 tab(s) ( 20 mg ), po, qpm, # 90 tab(s), 3 Refill(s), Type: Maintenance, Pharmacy: [facility], 1 tab(s) Oral qpm  cyclobenzaprine 10 mg oral tablet: See Instructions, Instructions: TAKE ONE TABLET BY MOUTH THREE TIMES A DAY AS NEEDED FOR PAIN, # 30 tab(s), 1 Refill(s), Type: Soft Stop, Pharmacy: [facility]  ketorolac 0.5% ophthalmic solution: 1 drop(s), Eye-Both, qid, PRN: as needed for itching, # 5 mL, 11 Refill(s), Type: Soft Stop, Pharmacy: [facility], 1 drop(s) Eye-Both qid,PRN:as needed for itching  olopatadine 0.1% ophthalmic  solution: 1 drop(s), both eyes, bid, PRN: for allergy symptoms, # 5 mL, 11 Refill(s), Type: Maintenance, Pharmacy: [facility], 1 drop(s) Eye-Both bid,PRN:for allergy symptoms  raNITIdine 300 mg oral tablet: = 1 tab(s) ( 300 mg ), po, daily, # 90 tab(s), 3 Refill(s), Type: Maintenance, Pharmacy: [facility], 1 tab(s) Oral daily  traMADol 50 mg oral tablet: 1 tab(s) ( 50 mg ), PO, q4hr, PRN: for pain, # 60 tab(s), 1 Refill(s), Type: Maintenance, called to pharmacy (Rx)  venlafaxine 150 mg oral capsule, extended release: = 1 cap(s) ( 150 mg ), po, daily, # 90 cap(s), 3 Refill(s), Type: Maintenance, Pharmacy: [facility], 1 cap(s) Oral daily  Documented Medications  Documented  Claritin 10 mg oral tablet: 1 tab(s) ( 10 mg ), po, daily, 0 Refill(s), Type: Maintenance  Flonase 50 mcg/inh nasal spray: 2 spray(s), nasal, daily, 0 Refill(s), Type: Maintenance  Multiple Vitamins oral tablet: 1 tab(s), PO, Daily, 0 Refill(s)  Tylenol: PO, 0 Refill(s)  clotrimazole 1% topical cream: Topical, bid, Instructions: to replace Econazole 1% cream which is not covered by insurance., 0 Refill(s), Type: Maintenance,    Medications          *denotes recorded medication          *Tylenol: PO.          Lipitor 20 mg oral tablet: 20 mg, 1 tab(s), PO, Daily, 90 tab(s), 3 Refill(s).          *clotrimazole 1% topical cream: Topical, bid, to replace Econazole 1% cream which is not covered by insurance., 0 Refill(s).          cyclobenzaprine 10 mg oral tablet: See Instructions, TAKE ONE TABLET BY MOUTH THREE TIMES A DAY AS NEEDED FOR PAIN, 30 tab(s), 1 Refill(s).          *Flonase 50 mcg/inh nasal spray: 2 spray(s), nasal, daily, 0 Refill(s).          ketorolac 0.5% ophthalmic solution: 1 drop(s), Eye-Both, qid, PRN: as needed for itching, 5 mL, 11 Refill(s).          *Claritin 10 mg oral tablet: 10 mg, 1 tab(s), po, daily.          *Multiple Vitamins oral tablet: 1 tab(s), PO, Daily.          olopatadine 0.1% ophthalmic solution:  1 drop(s), both eyes, bid, PRN: for allergy symptoms, 5 mL, 11 Refill(s).          raNITIdine 300 mg oral tablet: 300 mg, 1 tab(s), po, daily, 90 tab(s), 3 Refill(s).          Xarelto 20 mg oral tablet: 20 mg, 1 tab(s), po, qpm, 90 tab(s), 3 Refill(s).          traMADol 50 mg oral tablet: 50 mg, 1 tab(s), PO, q4hr, PRN: for pain, 60 tab(s), 1 Refill(s).          venlafaxine 150 mg oral capsule, extended release: 150 mg, 1 cap(s), po, daily, 90 cap(s), 3 Refill(s).     Problem list:    All Problems  Carpal tunnel syndrome, left / SNOMED CT 59700043 / Confirmed  Back pain, chronic / SNOMED CT 504591568 / Confirmed  Dysthymia / SNOMED CT 011676937 / Confirmed  GERD (gastroesophageal reflux disease) / SNOMED CT 977200517 / Confirmed  Hx pulmonary embolism / SNOMED CT 189243051 / Confirmed  Low HDL (under 40) / SNOMED CT 917454971 / Confirmed  Monoclonal gammopathy / SNOMED CT 600771599 / Confirmed  Obese / SNOMED CT 2016256205 / Probable  Seasonal allergies / SNOMED CT 615421543 / Confirmed  Snoring disorder / ICD-9-.09 / Confirmed  Inactive: Tinea pedis / SNOMED CT 26163383  Resolved: *Hospitalized@[facility] - Bilateral pulmonary emboli  Resolved: Folliculitis / SNOMED CT 03628378  Resolved: Irregular heartbeat / SNOMED CT 648231183  Resolved: Multiple pulmonary emboli / SNOMED CT 51915669  Resolved: Bilateral pulmonary embolism / SNOMED CT 63538135  Canceled: HTN (hypertension) / SNOMED CT 1985537700      Histories   Past Medical History:    Active  Dysthymia (556326499): Onset on 7/22/2010 at 39 years.  Comments:  7/22/2010 CDT 1:18 PM CDT -     GERD (gastroesophageal reflux disease) (930233551)  Low HDL (under 40) (326063617)  Obese (2178331305)  Seasonal allergies (746039361)  Comments:  12/19/2012 CST 9:33 AM Peace Qureshi  Stable.  Back pain, chronic (408242763)  Comments:  12/19/2012 CST 9:54 AM Peace Qureshi  An MRI has been done previously, maybe in 2005.  Snoring disorder  (786.09)  Comments:  12/19/2012 CST 9:40 AM Peace Qureshi  ENT evaluation by Dr. Jaleel Douglass on 05/07/2008.  A sleep study was performed; there was no evidence of apnea.    12/19/2012 CST 9:44 AM Peace Qureshi  This began after a severe pharyngitis.  Carpal tunnel syndrome, left (36175349)  Comments:  12/19/2012 CST 9:49 AM Peace Qureshi  Marcaine and Celestone injection done on 08/17/2007.  Resolved  *Hospitalized@[facility] - Bilateral pulmonary emboli: Onset on 5/15/2014 at 43 years.  Resolved on 5/16/2014 at 43 years.  Folliculitis (50615747): Onset in the month of 4/2008 at 37 years  Resolved.  Comments:  12/19/2012 CST 9:46 AM Peace Qureshi  Right thigh.  Irregular heartbeat (255405435): Onset on 4/17/2006 at 35 years.  Resolved.  Comments:  12/19/2012 CST 9:55 AM Peace Qureshi  Holter monitor placed.  Multiple pulmonary emboli (87551994):  Resolved.  Bilateral pulmonary embolism (23843664):  Resolved.   Family History:    Carpal Tunnel Syndrome  Mother  Asthma  Grandfather (P)  Daughter (Ceola)  Hypertension  Mother  Heart disease  Father  CA - Cancer  Mother  Comments:  1/12/2016 3:42 PM Angeline Dawson  skin  Hypercholesterolemia  Mother  Parkinson disease  Mother  HTN - Hypertension  Father  Back  Mother  Father  Prostate cancer..  Uncle  Comments:  12/19/2012 9:23 AM Peace Qureshi  Prostate cancer in his 60s.  GERD - Gastro-esophageal reflux disease  Sister  ASHD - Atherosclerotic heart disease  Mother     Procedure history:    Screening for colon cancer (SNOMED CT 9003354931) performed by Silvio Burns MD on 3/19/2019 at 48 Years.  Comments:  3/26/2019 9:05 AM CDT - Shanika Shipman MA result:  negative  Recommendation:  f/u 3yrs  Cardiac computed tomography for calcium scoring (SNOMED CT 2750748469) on 1/24/2017 at 46 Years.  Comments:  2/2/2017 12:14 PM CST - Adriane Hayden  Total Agatston calcium score is 18  Vasectomy (SNOMED CT 61873733) on 2/20/2009 at 38  Years.  Polysomnogram (SNOMED CT 538562721) on 4/21/2008 at 37 Years.  Comments:  12/19/2012 10:00 AM Peace Qureshi  Normal sleep architecture.  No evidence of obstructive sleep apnea.  Wrist injection (SNOMED CT 635046450) on 8/17/2007 at 36 Years.  Comments:  12/19/2012 9:50 AM Peace Qureshi  Marcaine and Celestone injection, left wrist, for carpal tunnel symptoms.  Holter monitor (SNOMED CT 204017820) performed by Britton Pepe MD on 4/20/2006 at 35 Years.  Comments:  12/19/2012 10:03 AM Peace Qureshi  Single 7-beat episode of asymptomatic slow atrial tachycardia with a rate of 102.  No other significant dysrhythmia.  Single episode of patient symptoms without dysrhythmia.  Cellulitis (SNOMED CT 5017210121) in 2005 at 35 Years.  Stress test ECG - treadmill (SNOMED CT 330236568) on 6/7/2005 at 34 Years.  Comments:  12/19/2012 10:06 AM Peace Qureshi  Performed at [facility] for non-exertional chest discomfort.  Adenoidectomy (SNOMED CT 220304473).  Tonsillectomy (SNOMED CT 216873597).  Lymph node removal from axilla.   Social History:        Alcohol Assessment            Current, 2-4 TIMES PER WEEK, 1 drinks/episode average.      Tobacco Assessment            Never      Substance Abuse Assessment            Never      Employment and Education Assessment            Employed, Work/School description: .      Home and Environment Assessment            Marital status: .  Spouse/Partner name: Mera Caballero.  3 children.  Risks in environment: owns               secured gun.      Nutrition and Health Assessment            Type of diet: Regular.      Exercise and Physical Activity Assessment            Exercise frequency: 1-2 times/week.  Exercise type: Walking, elliptical.      Sexual Assessment            Sexually active: Yes.  Sexual orientation: Straight or heterosexual.  Contraceptive Use Details: vasectomy,               wife had hysterectomy.      Physical  Examination   Vital Signs   5/16/2019 10:31 AM CDT Temperature Tympanic 97.0 DegF  LOW    Peripheral Pulse Rate 52 bpm  LOW    HR Method Electronic    Systolic Blood Pressure 114 mmHg    Diastolic Blood Pressure 77 mmHg    Mean Arterial Pressure 89 mmHg    BP Method Electronic      Measurements from flowsheet : Measurements   5/16/2019 10:31 AM CDT   Weight Measured - Standard                252.8 lb     General:  Alert and oriented, No acute distress.    Musculoskeletal:  tendon sheath cyst prox r 3rd finger laterally  cms otherwise intact.    Neurologic:  Alert, Oriented.       Impression and Plan   Diagnosis     Cyst of tendon sheath (QNL64-JB M67.80).     Course:  Progressing as expected.    Plan:  ortho refer  also small 2 mm forehead lesion will use otc steroid for a few weeks.    Patient Instructions:       Counseled: Patient, Regarding diagnosis, Regarding treatment, Activity.

## 2022-02-16 NOTE — NURSING NOTE
Comprehensive Intake Entered On:  3/21/2019 10:30 AM CDT    Performed On:  3/21/2019 10:29 AM CDT by Charlotte Villalba               Summary   Chief Complaint :   Lump on finger of right hand.  Ongoing x6 weeks.    Weight Measured :   249.8 lb(Converted to: 249 lb 13 oz, 113.31 kg)    Systolic Blood Pressure :   113 mmHg   Diastolic Blood Pressure :   73 mmHg   Mean Arterial Pressure :   86 mmHg   Peripheral Pulse Rate :   72 bpm   BP Method :   Electronic   HR Method :   Electronic   Temperature Tympanic :   97.6 DegF(Converted to: 36.4 DegC)  (LOW)    Charlotte Villalba - 3/21/2019 10:29 AM CDT

## 2022-02-16 NOTE — TELEPHONE ENCOUNTER
Entered by Silverio Bowens CMA on December 30, 2021 2:02:18 PM CST  ---------------------  From: Silverio Bowens CMA   To: [facility]    Sent: 12/30/2021 2:02:18 PM CST  Subject: Medication Management     ** Submitted: **  Order:famotidine (famotidine 40 mg oral tablet)  1 tab(s)  Oral  daily  Qty:  30 tab(s)        Refills:  0          Substitutions Allowed     Route To [facility]    Signed by Silverio Bowens CMA  12/30/2021 8:01:00 PM [facility]    ** Submitted: **  Complete:famotidine (famotidine 40 mg oral tablet)   Signed by Silverio Bowens CMA  12/30/2021 8:02:00 PM [facility]    ** Not Approved:  **  famotidine (famotidine 40 mg tablet)  TAKE ONE TABLET BY MOUTH EVERY DAY  Qty:  90 tab(s)        Days Supply:  90        Refills:  3          Substitutions Allowed     Route To [facility]   Signed by Silverio Bowens CMA            ** Patient matched by Silverio Bowens CMA on 12/30/2021 2:00:26 PM CST **      ------------------------------------------  From: [facility] [place]  To: Silvio Burns MD  Sent: December 30, 2021 9:39:02 AM CST  Subject: Medication Management  Due: December 15, 2021 8:09:06 PM CST     ** On Hold Pending Signature **     Drug: famotidine (famotidine 40 mg oral tablet), TAKE ONE TABLET BY MOUTH EVERY DAY  Quantity: 90 tab(s)  Days Supply: 360  Refills: 2  Substitutions Allowed  Notes from Pharmacy:     Dispensed Drug: famotidine (famotidine 40 mg oral tablet), TAKE ONE TABLET BY MOUTH EVERY DAY  Quantity: 90 tab(s)  Days Supply: 90  Refills: 3  Substitutions Allowed  Notes from Pharmacy:  ------------------------------------------Med Refill      Date of last office visit and reason:  1/26/21 Px      Date of last Med Check / Px:     Date of last labs pertaining to med:      Note:  Rx filled per protocol.  Silverio Bowens CMA    RTC order in chart:  _    For Protocol refill, has patient been contacted:  _

## 2022-02-16 NOTE — TELEPHONE ENCOUNTER
---------------------From: Adelia Manzo To:  <[email or web address]>;   Sent: 5/17/2019 5:10:03 AM CDTSubject: General Message Patient: VIRGIL MATHEWS; YOB: 1970 The attached Referral Note is for an appointment scheduled with  Dr. Oliver 5/23/19 at 12:50 in [place].

## 2022-02-16 NOTE — LETTER
(Inserted Image. Unable to display)   March 31, 2019      VIRGIL MATHEWS  [address] [place] [id number]        Dear VIRGIL,      Thank you for selecting [facility] for your healthcare needs.      The results of the cologuard stool test was received and your result was negative.      A negative result indicates a lower likelihood that colorectal cancer or advanced adenoma (pre-cancer) is present.  Periodic Colorectal Cancer Screenings are recommended in the future approximately every 3 years.            Please contact me or my assistant at [phone] if you have any questions or concerns.     Sincerely,        Silvio Burns MD

## 2022-02-16 NOTE — PROGRESS NOTES
Patient:   VIRGIL MATHEWS            MRN: [id number]            FIN: [id number]               Age:   46 years     Sex:  Male     :  1970   Associated Diagnoses:   Baker's cyst of knee   Author:   Silvio Burns MD      Visit Information      Date of Service: 10/31/2017 03:26 pm  Performing Location: [facility]  Encounter#: [id number]      Chief Complaint   10/31/2017 3:34 PM CDT   Lump behind left knee.  Uncomfortable while kneeling.   Occasional lightheadedness when going from kneeling to standing.        History of Present Illness   chief complaint and symptoms as noted above confirmed with patient   1-2 months      Review of Systems   Constitutional:  Negative except as documented in history of present illness.    Cardiovascular:  Negative.    Integumentary   Neurologic:  Negative.       Health Status   Allergies:    Allergic Reactions (Selected)  Severity Not Documented  Cats (No reactions were documented)  Citalopram (Hives)   Medications:  (Selected)   Prescriptions  Prescribed  Lipitor 20 mg oral tablet: 1 tab(s) ( 20 mg ), PO, Daily, # 90 tab(s), 1 Refill(s), Type: Maintenance, Pharmacy: [facility], 1 tab(s) po daily  Pataday 0.2% ophthalmic solution: 1 drop(s), both eyes, daily, # 2.5 mL, 5 Refill(s), Type: Soft Stop, Pharmacy: [facility], 1 drop(s) both eyes daily  Xarelto 20 mg oral tablet: 1 tab(s) ( 20 mg ), po, qpm, # 90 tab(s), 1 Refill(s), Type: Maintenance, Pharmacy: [facility], 1 tab(s) po qpm  cyclobenzaprine 10 mg oral tablet: See Instructions, Instructions: TAKE ONE TABLET BY MOUTH THREE TIMES A DAY AS NEEDED FOR PAIN, # 30 unknown unit, 1 Refill(s), Type: Soft Stop, Pharmacy: [facility]  econazole 1% topical cream: 1 modesto, TOP, BID, # 85 g, 5 Refill(s), Type: Maintenance, Pharmacy: [facility], 1 modesto top bid  ketorolac 0.5% ophthalmic solution: 1 drop(s), Both eyes, QID, PRN: for itching, # 5 mL, 6 Refill(s), Type: Maintenance, Pharmacy:  [facility], 1 drop(s) both eyes qid,PRN:for itching  lisinopril 10 mg oral tablet: 0.5 tab(s) ( 5 mg ), po, daily, # 90 tab(s), 1 Refill(s), Type: Maintenance, Pharmacy: [facility]  raNITIdine 300 mg oral tablet: 1 tab(s) ( 300 mg ), po, daily, # 90 tab(s), 1 Refill(s), Type: Maintenance, Pharmacy: [facility], 1 tab(s) po daily  traMADol 50 mg oral tablet: 1 tab(s) ( 50 mg ), PO, q4hr, PRN: for pain, # 60 tab(s), 1 Refill(s), Type: Maintenance, called to pharmacy (Rx)  venlafaxine 150 mg oral capsule, extended release: 1 cap(s) ( 150 mg ), po, daily, # 90 cap(s), 1 Refill(s), Type: Maintenance, Pharmacy: [facility], 1 cap(s) po daily  Documented Medications  Documented  Claritin 10 mg oral tablet: 1 tab(s) ( 10 mg ), po, daily, 0 Refill(s), Type: Maintenance  Flonase 50 mcg/inh nasal spray: 2 spray(s), nasal, daily, 0 Refill(s), Type: Maintenance  Multiple Vitamins oral tablet: 1 tab(s), PO, Daily, 0 Refill(s)  Tylenol: PO, 0 Refill(s)   Problem list:    All Problems (Selected)  Back pain, chronic / ICD-9-.5 / Confirmed  Carpal tunnel syndrome, left / ICD-9-.0 / Confirmed  Dysthymia / ICD-9-.4 / Confirmed  GERD (Gastroesophageal Reflux Disease) / ICD-9-.81 / Confirmed  HTN (Hypertension) / ICD-9-.9 / Confirmed  Low HDL (under 40) / SNOMED CT 850521790 / Confirmed  Obese / ICD-9-.00 / Probable  Multiple pulmonary emboli / SNOMED CT 68177597 / Confirmed  Bilateral pulmonary embolism / SNOMED CT 27219911 / Confirmed  Seasonal Allergies / ICD-9-.9 / Confirmed  Snoring disorder / ICD-9-.09 / Confirmed      Histories   Past Medical History:    Active  GERD (Gastroesophageal Reflux Disease) (530.81)  Low HDL (under 40) (286623692)  HTN (Hypertension) (401.9)  Seasonal Allergies (477.9)  Comments:  12/19/2012 CST 9:33 AM Peace Qureshi  Stable.  Back pain, chronic (724.5)  Comments:  12/19/2012 CST 9:54 AM Peace Qureshi  An MRI has been done  previously, maybe in 2005.  Snoring disorder (786.09)  Comments:  12/19/2012 CST 9:40 AM Peace Qureshi  ENT evaluation by Dr. Jaleel Douglass on 05/07/2008.  A sleep study was performed; there was no evidence of apnea.    12/19/2012 CST 9:44 AM Peace Qureshi  This began after a severe pharyngitis.  Carpal tunnel syndrome, left (354.0)  Comments:  12/19/2012 CST 9:49 AM Peace Qureshi  Marcbrittany and Celestone injection done on 08/17/2007.  Resolved  *Hospitalized@[facility] - Bilateral pulmonary emboli: Onset on 5/15/2014 at 43 years.  Resolved on 5/16/2014 at 43 years.  Folliculitis (23935255): Onset in the month of 4/2008 at 37 years  Resolved.  Comments:  12/19/2012 CST 9:46 AM Peace Qureshi  Right thigh.  Irregular Heartbeat (427.9): Onset on 4/17/2006 at 35 years.  Resolved.  Comments:  12/19/2012 CST 9:55 AM Peace Qureshi  Holter monitor placed.   Family History:    Carpal Tunnel Syndrome  Mother  Asthma  Grandfather (P)  Daughter (Jose)  Hypertension  Mother  Heart disease  Father  CA - Cancer  Mother  Comments:  1/12/2016 3:42 PM - Angeline Kong  skin  Hypercholesterolemia  Mother  Parkinson disease  Mother  HTN - Hypertension  Father  Back  Mother  Father  Prostate cancer..  Uncle  Comments:  12/19/2012 9:23 AM - Peace Ibarra  Prostate cancer in his 60s.  ASHD - Atherosclerotic heart disease  Mother     Procedure history:    Cardiac computed tomography for calcium scoring (SNOMED CT 6559188459) on 1/24/2017 at 46 Years.  Comments:  2/2/2017 12:14 PM - Adriane Hayden  Total Agatston calcium score is 18  Vasectomy (SNOMED CT 36539965) on 2/20/2009 at 38 Years.  Polysomnogram (SNOMED CT 516526393) on 4/21/2008 at 37 Years.  Comments:  12/19/2012 10:00 AM - Peace Ibarra  Normal sleep architecture.  No evidence of obstructive sleep apnea.  Wrist injection (SNOMED CT 211521337) on 8/17/2007 at 36 Years.  Comments:  12/19/2012 9:50 AM - Peace Ibarra and Celestone injection, left wrist, for carpal  tunnel symptoms.  Holter monitor (SNOMED CT 561642145) performed by Britton Pepe MD on 4/20/2006 at 35 Years.  Comments:  12/19/2012 10:03 AM - Peace Ibarra  Single 7-beat episode of asymptomatic slow atrial tachycardia with a rate of 102.  No other significant dysrhythmia.  Single episode of patient symptoms without dysrhythmia.  Cellulitis (SNOMED CT 8719779858) in 2005 at 35 Years.  Stress test ECG - treadmill (SNOMED CT 860349729) on 6/7/2005 at 34 Years.  Comments:  12/19/2012 10:06 AM - Peace Ibarra  Performed at [facility] [place] for non-exertional chest discomfort.  Adenoidectomy (SNOMED CT 807074707).  Tonsillectomy (SNOMED CT 876425477).  Lymph node removal from axilla.   Social History:        Alcohol Assessment: Current            Current                     Comments:                      03/08/2017 - Adelia Manzo                     1-2 drinks 2-3 times per week      Tobacco Assessment: Denies Tobacco Use            Never      Substance Abuse Assessment: Denies Substance Abuse            Never      Employment and Education Assessment            Employed, Work/School description: .      Home and Environment Assessment            Marital status: .  Spouse/Partner name: Mera Caballero.  3 children.      Nutrition and Health Assessment            Type of diet: Regular.        Physical Examination   Vital Signs   10/31/2017 3:34 PM CDT Temperature Tympanic 97.7 DegF  LOW    Peripheral Pulse Rate 74 bpm    HR Method Electronic    Systolic Blood Pressure 109 mmHg    Diastolic Blood Pressure 69 mmHg    Mean Arterial Pressure 82 mmHg    BP Method Electronic      Measurements from flowsheet : Measurements   10/31/2017 3:34 PM CDT   Weight Measured - Standard                238.6 lb     General:  Alert and oriented, No acute distress.    Musculoskeletal:       Lower extremity exam: Knee ( Left, Posterior, Swelling, Normal range of motion, cms otherwise intact ).       Impression and Plan    Diagnosis     Baker's cyst of knee (MEN96-XM M71.20).     Course:  Progressing as expected.    Plan:  us  consider pt  decrease ace.    Patient Instructions:       Counseled: Patient, Regarding diagnosis, Regarding medications, Activity.

## 2022-02-16 NOTE — PROGRESS NOTES
Patient:   VIRGIL MATHEWS            MRN: [id number]            FIN: [id number]               Age:   47 years     Sex:  Male     :  1970   Associated Diagnoses:   Hand pain, left   Author:   Silvio Burns MD      Visit Information      Date of Service: 2018 11:20 am  Performing Location: [facility]  Encounter#: [id number]      Chief Complaint   2018 11:25 AM CDT   Pt here for left hand pain x week.        History of Present Illness   chief complaint and symptoms as noted above confirmed with patient   no injury uses fine      Review of Systems   Constitutional:  Negative.    Integumentary:  Negative.    Neurologic:  Negative.       Health Status   Allergies:    Allergic Reactions (Selected)  Severity Not Documented  Cats (No reactions were documented)  Citalopram (Hives)   Medications:  (Selected)   Prescriptions  Prescribed  Lipitor 20 mg oral tablet: 1 tab(s) ( 20 mg ), PO, Daily, # 90 tab(s), 1 Refill(s), Type: Maintenance, Pharmacy: [facility], 1 tab(s) Oral daily  Xarelto 20 mg oral tablet: 1 tab(s) ( 20 mg ), po, qpm, # 90 tab(s), 1 Refill(s), Type: Maintenance, Pharmacy: [facility], 1 tab(s) Oral qpm  cyclobenzaprine 10 mg oral tablet: See Instructions, Instructions: TAKE ONE TABLET BY MOUTH THREE TIMES A DAY AS NEEDED FOR PAIN, # 30 tab(s), 1 Refill(s), Type: Soft Stop, Pharmacy: [facility]  ketorolac 0.5% ophthalmic solution: See Instructions, Instructions: USE 1 DROP IN BOTH EYES FOUR TIMES A DAY AS NEEDED FOR ITCHING, # 5 mL, 1 Refill(s), Type: Soft Stop, Pharmacy: [facility], USE 1 DROP IN BOTH EYES FOUR TIMES A DAY AS NEEDED FOR ITCHING  olopatadine 0.1% ophthalmic solution: 1 drop(s), both eyes, bid, PRN: for allergy symptoms, # 5 mL, 5 Refill(s), Type: Maintenance, Pharmacy: [facility], 1 drop(s) Eye-Both bid,PRN:for allergy symptoms  raNITIdine 300 mg oral tablet: 1 tab(s) ( 300 mg ), po, daily, # 90 tab(s), 1 Refill(s), Type:  Maintenance, Pharmacy: [facility], 1 tab(s) Oral daily  traMADol 50 mg oral tablet: 1 tab(s) ( 50 mg ), PO, q4hr, PRN: for pain, # 60 tab(s), 1 Refill(s), Type: Maintenance, called to pharmacy (Rx)  venlafaxine 150 mg oral capsule, extended release: 1 cap(s) ( 150 mg ), po, daily, # 90 cap(s), 1 Refill(s), Type: Maintenance, Pharmacy: [facility], 1 cap(s) Oral daily  Documented Medications  Documented  Claritin 10 mg oral tablet: 1 tab(s) ( 10 mg ), po, daily, 0 Refill(s), Type: Maintenance  Flonase 50 mcg/inh nasal spray: 2 spray(s), nasal, daily, 0 Refill(s), Type: Maintenance  Multiple Vitamins oral tablet: 1 tab(s), PO, Daily, 0 Refill(s)  Tylenol: PO, 0 Refill(s)  clotrimazole 1% topical cream: Topical, bid, Instructions: to replace Econazole 1% cream which is not covered by insurance., 0 Refill(s), Type: Maintenance   Problem list:    All Problems (Selected)  Carpal tunnel syndrome, left / SNOMED CT 19074297 / Confirmed  Back pain, chronic / SNOMED CT 237345529 / Confirmed  Dysthymia / SNOMED CT 991274747 / Confirmed  GERD (gastroesophageal reflux disease) / SNOMED CT 029488972 / Confirmed  Low HDL (under 40) / SNOMED CT 362952336 / Confirmed  Monoclonal gammopathy / SNOMED CT 539736437 / Confirmed  Obese / SNOMED CT 1160647245 / Probable  Multiple pulmonary emboli / SNOMED CT 47652359 / Confirmed  Bilateral pulmonary embolism / SNOMED CT 58787312 / Confirmed  Seasonal allergies / SNOMED CT 477319312 / Confirmed  Snoring disorder / ICD-9-.09 / Confirmed      Histories   Past Medical History:    Active  Dysthymia (474741193): Onset on 7/22/2010 at 39 years.  Comments:  7/22/2010 CDT 1:18 PM CDT -     GERD (gastroesophageal reflux disease) (907101298)  Low HDL (under 40) (928515729)  Obese (4844802436)  Seasonal allergies (986601745)  Comments:  12/19/2012 CST 9:33 AM Peace Qureshi  Stable.  Back pain, chronic (378908394)  Comments:  12/19/2012 CST 9:54 AM Peace Qureshi  An MRI has been  done previously, maybe in 2005.  Snoring disorder (786.09)  Comments:  12/19/2012 CST 9:40 AM Peace Qureshi  ENT evaluation by Dr. Jaleel Douglass on 05/07/2008.  A sleep study was performed; there was no evidence of apnea.    12/19/2012 CST 9:44 AM Peace Qureshi  This began after a severe pharyngitis.  Carpal tunnel syndrome, left (58457569)  Comments:  12/19/2012 CST 9:49 AM Peace Qureshi  Marcaine and Celestone injection done on 08/17/2007.  Resolved  *[email or web address]@[facility] - Bilateral pulmonary emboli: Onset on 5/15/2014 at 43 years.  Resolved on 5/16/2014 at 43 years.  Folliculitis (95730147): Onset in the month of 4/2008 at 37 years  Resolved.  Comments:  12/19/2012 CST 9:46 AM Peace Qureshi  Right thigh.  Irregular heartbeat (507719853): Onset on 4/17/2006 at 35 years.  Resolved.  Comments:  12/19/2012 CST 9:55 AM Peace Qureshi  Holter monitor placed.   Family History:    Carpal Tunnel Syndrome  Mother  Asthma  Grandfather (P)  Daughter (Ceola)  Hypertension  Mother  Heart disease  Father  CA - Cancer  Mother  Comments:  1/12/2016 3:42 PM - Angeline Kong  skin  Hypercholesterolemia  Mother  Parkinson disease  Mother  HTN - Hypertension  Father  Back  Mother  Father  Prostate cancer..  Uncle  Comments:  12/19/2012 9:23 AM - Peace Ibarra  Prostate cancer in his 60s.  GERD - Gastro-esophageal reflux disease  Sister  ASHD - Atherosclerotic heart disease  Mother     Procedure history:    Cardiac computed tomography for calcium scoring (SNOMED CT 1457984385) on 1/24/2017 at 46 Years.  Comments:  2/2/2017 12:14 PM - Adriane Hayden  Total Agatston calcium score is 18  Vasectomy (SNOMED CT 87117475) on 2/20/2009 at 38 Years.  Polysomnogram (SNOMED CT 611475412) on 4/21/2008 at 37 Years.  Comments:  12/19/2012 10:00 AM - Peace Ibarra  Normal sleep architecture.  No evidence of obstructive sleep apnea.  Wrist injection (SNOMED CT 960962206) on 8/17/2007 at 36 Years.  Comments:  12/19/2012 9:50 AM - Stacey  [facility]  Marcaine and Celestone injection, left wrist, for carpal tunnel symptoms.  Holter monitor (SNOMED CT 629273419) performed by Britton Pepe MD on 4/20/2006 at 35 Years.  Comments:  12/19/2012 10:03 AM - Peace Ibarra  Single 7-beat episode of asymptomatic slow atrial tachycardia with a rate of 102.  No other significant dysrhythmia.  Single episode of patient symptoms without dysrhythmia.  Cellulitis (SNOMED CT 3303066453) in 2005 at 35 Years.  Stress test ECG - treadmill (SNOMED CT 890296402) on 6/7/2005 at 34 Years.  Comments:  12/19/2012 10:06 AM - Peace Ibarra  Performed at [facility] for non-exertional chest discomfort.  Adenoidectomy (SNOMED CT 429754126).  Tonsillectomy (SNOMED CT 605472587).  Lymph node removal from axilla.   Social History:        Alcohol Assessment            Current, 1-2 times per week, 1 drinks/episode average.  2 drinks/episode maximum.      Tobacco Assessment            Never      Substance Abuse Assessment            Never      Employment and Education Assessment            Employed, Work/School description: .      Home and Environment Assessment            Marital status: .  Spouse/Partner name: Mera Caballero.  3 children.  Risks in environment: owns               secured gun.      Nutrition and Health Assessment            Type of diet: Regular.      Exercise and Physical Activity Assessment            Exercise frequency: 1-2 times/week.  Exercise type: Walking, elliptical.      Sexual Assessment            Sexually active: Yes.  Sexual orientation: Straight or heterosexual.  Contraceptive Use Details: vasectomy,               wife had hysterectomy.        Physical Examination   Vital Signs   11/1/2018 11:25 AM CDT Temperature Tympanic 97 DegF  LOW    Peripheral Pulse Rate 64 bpm    Pulse Site Radial artery    Respiratory Rate 16 br/min    Systolic Blood Pressure 104 mmHg    Diastolic Blood Pressure 68 mmHg    Mean Arterial Pressure 80 mmHg    BP  Site Right arm      Measurements from flowsheet : Measurements   11/1/2018 11:25 AM CDT Height Measured - Standard 73 in    Weight Measured - Standard 237 lb    BSA 2.35 m2    Body Mass Index 31.26 kg/m2  HI      General:  Alert and oriented, No acute distress.    Musculoskeletal:       Upper extremity exam: Hand ( Left, Bone  4th metacarpal, Mild, No swelling, Tenderness, Normal range of motion, cms otherwise intact ).    Neurologic:  Alert, Oriented.       Review / Management   Radiology results   Reveals no acute disease process      Impression and Plan   Diagnosis     Hand pain, left (EJC57-LG M79.642).     Course:  Progressing as expected.    Plan:  likely contusion  fu 1 week if not better sooner if worse, xray reviewed by myself and communicated to patient. I will call patient if the final reading is any different.    Patient Instructions:       Counseled: Patient, Regarding diagnosis, Activity.

## 2022-02-16 NOTE — PROGRESS NOTES
Patient:   VIRGIL MATHEWS            MRN: [id number]            FIN: [id number]               Age:   50 years     Sex:  Male     :  1970   Associated Diagnoses:   Annual exam; Obese; Hypercholesteremia; Dysthymia; Bilateral pulmonary embolism; Monoclonal gammopathy; Back pain, chronic   Author:   Silvio Burns MD      Visit Information      Date of Service: 2021 08:46 am  Performing Location: [facility]  Encounter#: [id number]      Primary Care Provider (PCP):  Silvio Burns MD# [id number]      Referring Provider:  Silvio Burns MD# [id number]   Visit type:  Annual exam.    Accompanied by:  No one.    Source of history:  Self.    History limitation:  None.       Chief Complaint   2021 8:51 AM CST    Px              Well Adult History    Patient here for annual follow-up and physical exam.  Overall he is doing well.  No bleeding issues.  Continues to see hematology.  With his weight loss blood pressures been good.  No tobacco use no CVA in alcohol use.  His wife and kids are good health some issues with all of his daughters with anxiety and depression.         Review of Systems   Constitutional:  No fever, No chills, No sweats, No weakness, No fatigue.    Eye:  No recent visual problem.    Ear/Nose/Mouth/Throat:  No sore throat.    Respiratory:  No shortness of breath, No cough, No sputum production.    Cardiovascular:  No chest pain, No peripheral edema.    Gastrointestinal:  Negative except as documented in history of present illness, No nausea, No vomiting, No diarrhea, No constipation.    Genitourinary:  No dysuria, No hematuria.    Musculoskeletal:  Negative except as documented in history of present illness, No back pain, No muscle pain.    Integumentary:  Negative except as documented in history of present illness, No rash.    Neurologic:  Alert and oriented X4.       Health Status   Allergies:    Allergic Reactions (Selected)  Severity Not  Documented  Cats (No reactions were documented)  Citalopram (Hives)   Medications:  (Selected)   Prescriptions  Prescribed  Xarelto 20 mg oral tablet: = 1 tab(s), Oral, qpm, # 90 tab(s), 3 Refill(s), Type: Maintenance, Pharmacy: [facility], 1 tab(s) Oral qpm, 73, in, 01/26/21 8:51:00 CST, Height Measured, 278.6, lb, 01/26/21 8:51:00 CST, Weight Measured  atorvastatin 20 mg oral tablet: = 1 tab(s), Oral, daily, # 90 tab(s), 3 Refill(s), Type: Maintenance, Pharmacy: [facility], 1 tab(s) Oral daily, 73, in, 01/26/21 8:51:00 CST, Height Measured, 278.6, lb, 01/26/21 8:51:00 CST, Weight Measured  clotrimazole 1% topical cream: 1 modesto, Topical, bid, # 60 gm, 11 Refill(s), Type: Maintenance, Pharmacy: [facility], 1 modesot Topical bid, 73, in, 01/26/21 8:51:00 CST, Height Measured, 278.6, lb, 01/26/21 8:51:00 CST, Weight Measured  cyclobenzaprine 10 mg oral tablet: = 1 tab(s) ( 10 mg ), Oral, tid, PRN: for pain, # 30 tab(s), 1 Refill(s), Type: Soft Stop, Pharmacy: [facility], 1 tab(s) Oral tid,PRN:for pain  famotidine 40 mg oral tablet: = 1 tab(s), Oral, daily, # 90 tab(s), 3 Refill(s), Type: Maintenance, Pharmacy: [facility], 1 tab(s) Oral daily, 73, in, 01/26/21 8:51:00 CST, Height Measured, 278.6, lb, 01/26/21 8:51:00 CST, Weight Measured  ketorolac 0.5% ophthalmic solution: 1 drop(s), Eye-Both, qid, PRN: as needed for itching, # 5 mL, 11 Refill(s), Type: Soft Stop, Pharmacy: [facility], 1 drop(s) Eye-Both qid,PRN:as needed for itching, 73, in, 01/26/21 8:51:00 CST, Height Measured, 278.6, lb, 01/26/21 8:51:00 CST,...  olopatadine 0.1% ophthalmic solution: 1 drop(s), both eyes, bid, PRN: for allergy symptoms, # 5 mL, 11 Refill(s), Type: Maintenance, Pharmacy: [facility], 1 drop(s) Eye-Both bid,PRN:for allergy symptoms, 73, in, 01/26/21 8:51:00 CST, Height Measured, 278.6, lb, 01/26/21 8:51:00 CST,...  traMADol 50 mg oral tablet: = 1 tab(s) ( 50 mg ), PO, q4hr, PRN: for pain, # 60  tab(s), 0 Refill(s), Type: Maintenance, Pharmacy: [facility], 1 tab(s) Oral q4 hrs,PRN:for pain, 73, in, 06/16/20 9:16:00 CDT, Height Measured, 266, lb, 06/16/20 9:16:00 CDT, Weight Measured  venlafaxine 150 mg oral capsule, extended release: = 1 cap(s), Oral, daily, # 90 cap(s), 3 Refill(s), Type: Maintenance, Pharmacy: [facility], 1 cap(s) Oral daily, 73, in, 01/26/21 8:51:00 CST, Height Measured, 278.6, lb, 01/26/21 8:51:00 CST, Weight Measured  Documented Medications  Documented  Claritin 10 mg oral tablet: 1 tab(s) ( 10 mg ), po, daily, 0 Refill(s), Type: Maintenance  Flonase 50 mcg/inh nasal spray: 2 spray(s), nasal, daily, 0 Refill(s), Type: Maintenance  Multiple Vitamins oral tablet: 1 tab(s), PO, Daily, 0 Refill(s)  Tylenol: PO, 0 Refill(s),    Medications          *denotes recorded medication          *Tylenol: PO.          atorvastatin 20 mg oral tablet: 1 tab(s), Oral, daily, 90 tab(s), 3 Refill(s).          clotrimazole 1% topical cream: 1 modesto, Topical, bid, 60 gm, 11 Refill(s).          cyclobenzaprine 10 mg oral tablet: 10 mg, 1 tab(s), Oral, tid, PRN: for pain, 30 tab(s), 1 Refill(s).          famotidine 40 mg oral tablet: 1 tab(s), Oral, daily, 90 tab(s), 3 Refill(s).          *Flonase 50 mcg/inh nasal spray: 2 spray(s), nasal, daily, 0 Refill(s).          ketorolac 0.5% ophthalmic solution: 1 drop(s), Eye-Both, qid, PRN: as needed for itching, 5 mL, 11 Refill(s).          *Claritin 10 mg oral tablet: 10 mg, 1 tab(s), po, daily.          *Multiple Vitamins oral tablet: 1 tab(s), PO, Daily.          olopatadine 0.1% ophthalmic solution: 1 drop(s), both eyes, bid, PRN: for allergy symptoms, 5 mL, 11 Refill(s).          Xarelto 20 mg oral tablet: 1 tab(s), Oral, qpm, 90 tab(s), 3 Refill(s).          traMADol 50 mg oral tablet: 50 mg, 1 tab(s), PO, q4hr, PRN: for pain, 60 tab(s), 0 Refill(s).          venlafaxine 150 mg oral capsule, extended release: 1 cap(s), Oral, daily, 90 cap(s), 3  Refill(s).       Problem list:    All Problems (Selected)  Carpal tunnel syndrome, left / SNOMED CT 83742161 / Confirmed  Back pain, chronic / SNOMED CT 235387704 / Confirmed  Dysthymia / SNOMED CT 972860759 / Confirmed  GERD (gastroesophageal reflux disease) / SNOMED CT 978718969 / Confirmed  Hx pulmonary embolism / SNOMED CT 063448225 / Confirmed  Low HDL (under 40) / SNOMED CT 005109989 / Confirmed  Monoclonal gammopathy / SNOMED CT 502931913 / Confirmed  Obese / SNOMED CT 7744803824 / Probable  Seasonal allergies / SNOMED CT 453012497 / Confirmed  Snoring disorder / ICD-9-.09 / Confirmed      Histories   Past Medical History:    Active  Dysthymia (415367719): Onset on 7/22/2010 at 39 years.  Comments:  7/22/2010 CDT 1:18 PM CDT -     GERD (gastroesophageal reflux disease) (255159708)  Low HDL (under 40) (257761189)  Obese (8482219732)  Seasonal allergies (208373827)  Comments:  12/19/2012 CST 9:33 AM Peace Qureshi  Stable.  Back pain, chronic (429955156)  Comments:  12/19/2012 CST 9:54 AM Peace Qureshi  An MRI has been done previously, maybe in 2005.  Snoring disorder (786.09)  Comments:  12/19/2012 CST 9:40 AM Peace Qureshi  ENT evaluation by Dr. Jaleel Douglass on 05/07/2008.  A sleep study was performed; there was no evidence of apnea.    12/19/2012 CST 9:44 AM Peace Qureshi  This began after a severe pharyngitis.  Carpal tunnel syndrome, left (77324442)  Comments:  12/19/2012 CST 9:49 AM Peace Qureshi  Marcaine and Celestone injection done on 08/17/2007.  Resolved  *[email or web address]@[email or web address] - Bilateral pulmonary emboli: Onset on 5/15/2014 at 43 years.  Resolved on 5/16/2014 at 43 years.  Folliculitis (32503884): Onset in the month of 4/2008 at 37 years  Resolved.  Comments:  12/19/2012 CST 9:46 AM Peace Qureshi  Right thigh.  Irregular heartbeat (113786976): Onset on 4/17/2006 at 35 years.  Resolved.  Comments:  12/19/2012 CST 9:55 AM CST - Peace Ibarra  Holter monitor placed.  Multiple  pulmonary emboli (36693588):  Resolved.  Bilateral pulmonary embolism (87847717):  Resolved.   Family History:    Carpal Tunnel Syndrome  Mother  Asthma  Grandfather (P)  Daughter (Jose)  Hypertension  Mother  Heart disease  Father  CA - Cancer  Mother  Comments:  1/12/2016 3:42 PM ANIYAH Kong Angeline  skin  Hypercholesterolemia  Mother  Parkinson disease  Mother  HTN - Hypertension  Father  Back  Mother  Father  Prostate cancer..  Uncle  Comments:  12/19/2012 9:23 AM Peace Qureshi  Prostate cancer in his 60s.  GERD - Gastro-esophageal reflux disease  Sister  ASHD - Atherosclerotic heart disease  Mother     Procedure history:    Screening for colon cancer (SNOMED CT 7064298162) performed by Silvio Burns MD on 3/19/2019 at 48 Years.  Comments:  3/26/2019 9:05 AM CDT - Shanika Shipman MA result:  negative  Recommendation:  f/u 3yrs  Cardiac computed tomography for calcium scoring (SNOMED CT 4346125376) on 1/24/2017 at 46 Years.  Comments:  2/2/2017 12:14 PM Adriane Ayala  Total Agatston calcium score is 18  Vasectomy (SNOMED CT 14807231) on 2/20/2009 at 38 Years.  Polysomnogram (SNOMED CT 362817320) on 4/21/2008 at 37 Years.  Comments:  12/19/2012 10:00 AM Peace Qureshi  Normal sleep architecture.  No evidence of obstructive sleep apnea.  Wrist injection (SNOMED CT 824700611) on 8/17/2007 at 36 Years.  Comments:  12/19/2012 9:50 AM Peace Qureshi  Marcaine and Celestone injection, left wrist, for carpal tunnel symptoms.  Holter monitor (SNOMED CT 470759897) performed by Britton Pepe MD on 4/20/2006 at 35 Years.  Comments:  12/19/2012 10:03 AM Peace Qureshi  Single 7-beat episode of asymptomatic slow atrial tachycardia with a rate of 102.  No other significant dysrhythmia.  Single episode of patient symptoms without dysrhythmia.  Cellulitis (SNOMED CT 1028808578) in 2005 at 35 Years.  Stress test ECG - treadmill (SNOMED CT 259967311) on 6/7/2005 at 34  Years.  Comments:  12/19/2012 10:06 AM CST - Peace Ibarra  Performed at [facility] for non-exertional chest discomfort.  Adenoidectomy (SNOMED CT 192485232).  Tonsillectomy (SNOMED CT 513058697).  Lymph node removal from axilla.   Social History:        Electronic Cigarette/Vaping Assessment            Electronic Cigarette Use: Never.      Alcohol Assessment            Current, Liquor (Hard) (1.5 oz), Daily, 1 drinks/episode average.  Ready to change: No.      Tobacco Assessment            Never (less than 100 in lifetime)      Substance Abuse Assessment            Never      Employment and Education Assessment            Employed, Work/School description: .      Home and Environment Assessment            Marital status: .  Spouse/Partner name: Mera Caballero.  3 children.  Living situation:               Home/Independent.  Injuries/Abuse/Neglect in household: No.  Feels unsafe at home: No.  Family/Friends               available for support: Yes.  Risks in environment: owns secured gun.      Nutrition and Health Assessment            Type of diet: Regular.  Wants to lose weight: Yes.  Sleeping concerns: No.  Feels highly stressed: No.      Exercise and Physical Activity Assessment            Exercise frequency: 3-4 times/week.  Exercise type: Karate.      Sexual Assessment            Sexually active: Yes.  Identifies as male, Sexual orientation: Straight or heterosexual.  History of STD: No.               Contraceptive Use Details: vasectomy, wife had hysterectomy.  History of sexual abuse: No.        Physical Examination   Vital Signs   1/26/2021 8:51 AM CST Temperature Tympanic 97.5 DegF  LOW    Peripheral Pulse Rate 72 bpm    HR Method Electronic    Systolic Blood Pressure 120 mmHg    Diastolic Blood Pressure 82 mmHg  HI    Mean Arterial Pressure 95 mmHg    BP Method Electronic      Measurements from flowsheet : Measurements   1/26/2021 8:51 AM CST Height Measured - Standard 73 in     Weight Measured - Standard 278.6 lb    BSA 2.55 m2    Body Mass Index 36.75 kg/m2  HI      General:  Alert and oriented, No acute distress.    Eye:  Pupils are equal, round and reactive to light, Extraocular movements are intact.    HENT:  Normocephalic, Tympanic membranes are clear, Normal hearing.    Neck:  Supple, Non-tender, No carotid bruit, No jugular venous distention, No lymphadenopathy, No thyromegaly.    Respiratory:  Lungs are clear to auscultation, Respirations are non-labored, Breath sounds are equal.    Cardiovascular:  Normal rate, Regular rhythm, No murmur, Good pulses equal in all extremities, No edema.    Gastrointestinal:  Soft, Non-distended, No organomegaly.    Lymphatics:  No lymphadenopathy neck, axilla, groin.    Musculoskeletal:  No swelling, degenerative changes noted.    Integumentary:  Dry.    Neurologic:  Alert, Oriented, No focal deficits, Cranial Nerves II-XII are grossly intact.    Psychiatric:  Cooperative, Appropriate mood & affect, Normal judgment.       Review / Management   Results review:  Lab results   1/12/2021 8:53 AM CST Sodium Level 138 mmol/L    Potassium Level 4.4 mmol/L    Chloride Level 105 mmol/L    CO2 Level 25 mmol/L    Glucose Level 103 mg/dL  HI    BUN 19 mg/dL    Creatinine Level 1.00 mg/dL    BUN/Creat Ratio NOT APPLICABLE    eGFR 86 mL/min/1.73m2    eGFR African American 100 mL/min/1.73m2    Calcium Level 9.4 mg/dL    Hgb A1c 5.3    Cholesterol 147 mg/dL    Non-HDL Cholesterol 111    HDL 36 mg/dL  LOW    Cholesterol/HDL Ratio 4.1    LDL 88    Triglyceride 133 mg/dL   .       Impression and Plan   Diagnosis     Annual exam (LST93-AU Z00.00).     Obese (ALM93-NA E66.9).     Hypercholesteremia (JRR14-TP E78.00).     Dysthymia (WRR96-QU F34.1).     Bilateral pulmonary embolism (PSH69-NT I26.99).     Monoclonal gammopathy (WPJ38-VU D47.2).     Back pain, chronic (PUL23-KE M54.9).     Course:  Progressing as expected.    Plan:  BMI,Weight loss,exercise,diet  discussed, Problem 1 history of pulmonary embolism x2 on Xarelto chronically no bleeding issues 2history of monoclonal gammopathy followed by hematology has been stable  3.  Chronic back pain stable rare use of tramadol maybe 2 a month  4.  Hyperlipidemia with abnormal cardiac calcium score stable on atorvastatin  5.  Chronic reflux stable with famotidine  6.  Dysthymia on venlafaxine is very pleased with the dose will make no changes   .         Follow-up: With Primary Care Provider, Return to clinic, In 12 months.

## 2022-03-02 NOTE — LETTER
(Inserted Image. Unable to display)   January 26, 2022  VIRGIL MATHEWS  [address] [place] [address]        Dear VIRGIL,    Thank you for selecting [facility] for your healthcare needs.    Our records indicate you are due for the following services:     Annual Physical   Fasting Lab Tests ~ Please do not eat or drink anything 10 hours prior to your scheduled appointment time.  (Water and any medications that you may need are allowed unless directed otherwise.)     If you had your labs done at another facility or with Direct Access Lab Testing at [facility], please bring in a copy of the results to your next visit, mail a copy, or drop off a copy of your results to your Healthcare Provider.     (FYI   Regarding office visits: In some instances, a video visit or telephone visit may be offered as an option.)    To schedule an appointment or if you have further questions, please contact your clinic at [phone].    Powered by [employer] and [employer]    Sincerely,    Silvio Burns MD

## 2022-03-02 NOTE — TELEPHONE ENCOUNTER
Entered by Amy Becerra LPN on February 09, 2022 12:50:59 PM CST  ---------------------  From: Amy Becerra LPN   To: [facility]    Sent: 2/9/2022 12:50:59 PM CST  Subject: Medication Management     ** Submitted: **  Order:atorvastatin (atorvastatin 20 mg oral tablet)  See Instructions  TAKE ONE TABLET BY MOUTH EVERY DAY  Qty:  30 tab(s)        Refills:  0          Substitutions Allowed     Route To [facility]    Signed by Amy Becerra LPN  2/9/2022 6:50:00 PM [facility]    ** Submitted: **  Complete:atorvastatin (atorvastatin 20 mg oral tablet)   Signed by Amy Becerra LPN  2/9/2022 6:50:00 PM [facility]    ** Not Approved:  **  atorvastatin (atorvastatin 20 mg tablet)  TAKE ONE TABLET BY MOUTH EVERY DAY  Qty:  90 tab(s)        Days Supply:  90        Refills:  3          Substitutions Allowed     Route To [facility]   Note from Pharmacy:  This prescription was filled on 11/16/2021. Any refills authorized will be placed on file.  Signed by Amy Becerra LPN            ** Patient matched by Amy Becerra LPN on 2/9/2022 12:49:45 PM CST **      ------------------------------------------  From: [facility]  To: Silvio Burns MD  Sent: February 7, 2022 8:03:24 AM CST  Subject: Medication Management  Due: January 21, 2022 11:04:18 AM CST     ** On Hold Pending Signature **     Drug: atorvastatin (atorvastatin 20 mg oral tablet), TAKE ONE TABLET BY MOUTH EVERY DAY  Quantity: 90 tab(s)  Days Supply: 90  Refills: 2  Substitutions Allowed  Notes from Pharmacy:     Dispensed Drug: atorvastatin (atorvastatin 20 mg oral tablet), TAKE ONE TABLET BY MOUTH EVERY DAY  Quantity: 90 tab(s)  Days Supply: 90  Refills: 3  Substitutions Allowed  Notes from Pharmacy: This prescription was filled on 11/16/2021. Any refills authorized will be placed on file.  ------------------------------------------Pt due for appt. Protocol sent. Note sent to pharmacy

## 2022-03-02 NOTE — TELEPHONE ENCOUNTER
Entered by Diane Terry CMA on February 03, 2022 8:52:20 AM CST  ---------------------  From: Diane Terry CMA   To: [facility]    Sent: 2/3/2022 8:52:20 AM CST  Subject: Medication Management     ** Submitted: **  Order:venlafaxine (venlafaxine 150 mg oral capsule, extended release)  1 cap(s)  Oral  daily  Qty:  30 cap(s)        Refills:  0          Substitutions Allowed     Route To Pharmacy - [facility]    Signed by Diane Terry CMA  2/3/2022 2:49:00 PM [facility]    ** Submitted: **  Complete:venlafaxine (venlafaxine 150 mg oral capsule, extended release)   Signed by Diane Terry CMA  2/3/2022 2:52:00 PM [facility]    ** Not Approved:  **  venlafaxine (venlafaxine  mg capsule,extended release 24 hr)  TAKE ONE CAPSULE BY MOUTH EVERY DAY  Qty:  90 cap(s)        Days Supply:  90        Refills:  3          Substitutions Allowed     Route To Pharmacy - [facility]   Note from Pharmacy:  This prescription was filled on 11/9/2021. Any refills authorized will be placed on file.  Signed by Diane Terry CMA            ** Patient matched by Diane Terry CMA on 2/3/2022 8:48:56 AM CST **      ------------------------------------------  From: [facility]  To: Silvio Burns MD  Sent: January 31, 2022 8:01:49 AM CST  Subject: Medication Management  Due: January 21, 2022 11:04:18 AM CST     ** On Hold Pending Signature **     Drug: venlafaxine (venlafaxine 150 mg oral capsule, extended release), TAKE ONE CAPSULE BY MOUTH EVERY DAY  Quantity: 90 cap(s)  Days Supply: 90  Refills: 2  Substitutions Allowed  Notes from Pharmacy:     Dispensed Drug: venlafaxine (venlafaxine 150 mg oral capsule, extended release), TAKE ONE CAPSULE BY MOUTH EVERY DAY  Quantity: 90 cap(s)  Days Supply: 90  Refills: 3  Substitutions Allowed  Notes from Pharmacy: This prescription was filled on 11/9/2021. Any refills authorized will be placed on file.  ------------------------------------------Refilled per  protocol. Note to pharm.

## 2022-03-02 NOTE — TELEPHONE ENCOUNTER
Entered by Silverio Bowens CMA on January 11, 2022 5:13:02 PM CST  ---------------------  From: Silverio Bowens CMA   To: [facility]    Sent: 1/11/2022 5:13:02 PM CST  Subject: Medication Management     ** Submitted: **  Complete:rivaroxaban (Xarelto 20 mg oral tablet)   Signed by Silverio Bowens CMA  1/11/2022 11:13:00 PM [facility]    ** Approved with modifications: **  rivaroxaban (Xarelto 20 mg tablet)  TAKE ONE TABLET BY MOUTH EVERY EVENING  Qty:  30 tab(s)        Days Supply:  30        Refills:  0          Substitutions Allowed     Route To Pharmacy - [facility]   Note from Pharmacy:  This prescription was filled on 12/17/2021. Any refills authorized will be placed on file.  Note to Pharmacy:  Pt due for annual exam and labwork for further refills  Signed by Silverio Bowens CMA            ** Patient matched by Silverio Bowens CMA on 1/11/2022 5:11:45 PM CST **      ------------------------------------------  From: [facility] [place]  To: Silvio Burns MD  Sent: January 9, 2022 8:01:05 AM CST  Subject: Medication Management  Due: December 15, 2021 8:09:06 PM CST     ** On Hold Pending Signature **     Drug: rivaroxaban (Xarelto 20 mg oral tablet), TAKE ONE TABLET BY MOUTH EVERY EVENING  Quantity: 90 tab(s)  Days Supply: 330  Refills: 2  Substitutions Allowed  Notes from Pharmacy:     Dispensed Drug: rivaroxaban (Xarelto 20 mg oral tablet), TAKE ONE TABLET BY MOUTH EVERY EVENING  Quantity: 30 tab(s)  Days Supply: 30  Refills: 3  Substitutions Allowed  Notes from Pharmacy: This prescription was filled on 12/17/2021. Any refills authorized will be placed on file.  ------------------------------------------Med Refill      Date of last office visit and reason:  1/26/21 Px      Date of last Med Check / Px:     Date of last labs pertaining to med:  1/12/21    Note:  Rx filled per protocol.  Silverio Bowens CMA    RTC order in chart:  yes    For Protocol refill, has patient been contacted:  _

## 2022-06-06 DIAGNOSIS — J30.2 SEASONAL ALLERGIC RHINITIS, UNSPECIFIED TRIGGER: Primary | ICD-10-CM

## 2022-06-06 DIAGNOSIS — E78.5 ELEVATED LIPIDS: ICD-10-CM

## 2022-06-09 NOTE — TELEPHONE ENCOUNTER
Routing refill request to provider for review/approval because:  Labs not current:  No LDL on file in [facility] [place] or Epic  Patient needs to be seen because it has been more than 1 year since last office visit.  LOV: 01/26/2021    Also routing to TC. Please reach out and schedule patient.    Kira Ang, YOBANI  [facility] RN Triage Team

## 2022-06-10 PROBLEM — K21.9 GASTROESOPHAGEAL REFLUX DISEASE: Status: ACTIVE | Noted: 2022-06-10

## 2022-06-10 PROBLEM — G89.29 CHRONIC BACK PAIN: Status: ACTIVE | Noted: 2022-06-10

## 2022-06-10 PROBLEM — J30.2 SEASONAL ALLERGIC RHINITIS: Status: ACTIVE | Noted: 2022-06-10

## 2022-06-10 PROBLEM — E66.9 OBESITY: Status: ACTIVE | Noted: 2022-06-10

## 2022-06-10 PROBLEM — R06.83 SNORING: Status: ACTIVE | Noted: 2022-06-10

## 2022-06-10 PROBLEM — M54.9 CHRONIC BACK PAIN: Status: ACTIVE | Noted: 2022-06-10

## 2022-06-10 PROBLEM — Z86.711 HISTORY OF PULMONARY EMBOLISM: Status: ACTIVE | Noted: 2022-06-10

## 2022-06-10 PROBLEM — D47.2 MONOCLONAL GAMMOPATHY: Status: ACTIVE | Noted: 2022-06-10

## 2022-06-10 PROBLEM — E78.6 HYPOALPHALIPOPROTEINEMIA: Status: ACTIVE | Noted: 2022-06-10

## 2022-06-10 RX ORDER — ATORVASTATIN CALCIUM 20 MG/1
TABLET, FILM COATED ORAL
COMMUNITY
Start: 2021-01-26

## 2022-06-10 RX ORDER — VENLAFAXINE HYDROCHLORIDE 150 MG/1
CAPSULE, EXTENDED RELEASE ORAL
COMMUNITY
Start: 2021-01-26

## 2022-06-10 RX ORDER — FAMOTIDINE 40 MG/1
40 TABLET, FILM COATED ORAL
COMMUNITY
Start: 2021-12-30

## 2022-06-10 RX ORDER — ATORVASTATIN CALCIUM 20 MG/1
TABLET, FILM COATED ORAL
Qty: 30 TABLET | Refills: 11 | Status: SHIPPED | OUTPATIENT
Start: 2022-06-10 | End: 2023-04-20

## 2022-06-10 RX ORDER — KETOROLAC TROMETHAMINE 5 MG/ML
SOLUTION OPHTHALMIC
Qty: 5 ML | Refills: 11 | Status: SHIPPED | OUTPATIENT
Start: 2022-06-10

## 2022-06-10 RX ORDER — KETOROLAC TROMETHAMINE 5 MG/ML
SOLUTION OPHTHALMIC
COMMUNITY
Start: 2021-01-26

## 2022-06-16 NOTE — PROCEDURES
Accession Number:       [id number]  PATHOLOGIST:     Rhina Tomlin MD Board Certified in Anatomic Pathology and Clinical Pathology 3  (electronic signature)  REPORT NOTES:     This case was reviewed at the daily anatomic pathology conference.  A SOURCE:     Skin, right shoulder/back, punch excision  A GROSS DESCRIPTION:     See comment       Specimen is received in formalin, labeled with       multiple patient identifier(s) and consists of       one piece from a punch skin biopsy measuring 0.3       x 0.3 x 0.3 cm, circular in shape and tan-gray in       color. The margins are inked green. The specimen       is bisected and entirely submitted in one       cassette(s).         Gross exam(s) performed at: [address] [address]         : SELMA ROBLERO MD  A DIAGNOSIS:     Consistent with syringocystadenoma papilliferum; margins appear uninvolved.

## 2023-04-19 DIAGNOSIS — E78.5 ELEVATED LIPIDS: ICD-10-CM

## 2023-04-19 NOTE — TELEPHONE ENCOUNTER
Routing refill request to provider for review/approval because:    Patient has established care outside of [facility]. Please have patient call and redirect prescription to new provider.       YOBANI Ramos  [facility]

## 2023-04-19 NOTE — TELEPHONE ENCOUNTER
LM for [facility] stating pt has transferred care and redirect to current PCP.

## 2023-04-20 RX ORDER — ATORVASTATIN CALCIUM 20 MG/1
TABLET, FILM COATED ORAL
Qty: 30 TABLET | Refills: 11 | Status: SHIPPED | OUTPATIENT
Start: 2023-04-20

## 2023-06-19 DIAGNOSIS — J30.2 SEASONAL ALLERGIC RHINITIS, UNSPECIFIED TRIGGER: ICD-10-CM

## 2023-06-19 RX ORDER — KETOROLAC TROMETHAMINE 5 MG/ML
SOLUTION OPHTHALMIC
Qty: 5 ML | Refills: 11 | OUTPATIENT
Start: 2023-06-19

## 2023-06-19 NOTE — TELEPHONE ENCOUNTER
"    Last office visit: Visit date not found (2/7/23- established care with provider at [facility]).  Last RX: 6/10/22 (1 year supply).    Future Appointments 6/19/2023 - 12/16/2023    None          Requested Prescriptions   Pending Prescriptions Disp Refills     ketorolac (ACULAR) 0.5 % ophthalmic solution [Pharmacy Med Name: ketorolac 0.5 % eye drops] 5 mL 11     Sig: instill 1 drop IN EACH EYE TWICE DAILY AS NEEDED FOR ITCHING       Miscellaneous Opthalmic Allergy Drops Protocol Failed - 6/19/2023  8:55 AM        Failed - Recent (12 mo) or future (30 days) visit within the authorizing provider's specialty     Patient has had an office visit with the authorizing provider or a provider within the authorizing providers department within the previous 12 mos or has a future within next 30 days. See \"Patient Info\" tab in inbasket, or \"Choose Columns\" in Meds & Orders section of the refill encounter.              Passed - Patient is age 4 or older        Passed - Medication is active on med list                   "